# Patient Record
Sex: FEMALE | NOT HISPANIC OR LATINO | URBAN - METROPOLITAN AREA
[De-identification: names, ages, dates, MRNs, and addresses within clinical notes are randomized per-mention and may not be internally consistent; named-entity substitution may affect disease eponyms.]

---

## 2018-11-22 ENCOUNTER — INPATIENT (INPATIENT)
Facility: HOSPITAL | Age: 73
LOS: 3 days | Discharge: HOME | DRG: 153 | End: 2018-11-26
Attending: HOSPITALIST | Admitting: HOSPITALIST
Payer: MEDICAID

## 2018-11-22 VITALS
DIASTOLIC BLOOD PRESSURE: 86 MMHG | RESPIRATION RATE: 16 BRPM | SYSTOLIC BLOOD PRESSURE: 138 MMHG | TEMPERATURE: 99 F | OXYGEN SATURATION: 94 % | HEIGHT: 55 IN | HEART RATE: 91 BPM | WEIGHT: 130.07 LBS

## 2018-11-22 DIAGNOSIS — R07.9 CHEST PAIN, UNSPECIFIED: ICD-10-CM

## 2018-11-22 DIAGNOSIS — B97.4 RESPIRATORY SYNCYTIAL VIRUS AS THE CAUSE OF DISEASES CLASSIFIED ELSEWHERE: ICD-10-CM

## 2018-11-22 DIAGNOSIS — Z29.9 ENCOUNTER FOR PROPHYLACTIC MEASURES, UNSPECIFIED: ICD-10-CM

## 2018-11-22 DIAGNOSIS — R13.10 DYSPHAGIA, UNSPECIFIED: ICD-10-CM

## 2018-11-22 DIAGNOSIS — E78.5 HYPERLIPIDEMIA, UNSPECIFIED: ICD-10-CM

## 2018-11-22 DIAGNOSIS — I10 ESSENTIAL (PRIMARY) HYPERTENSION: ICD-10-CM

## 2018-11-22 DIAGNOSIS — E11.9 TYPE 2 DIABETES MELLITUS WITHOUT COMPLICATIONS: ICD-10-CM

## 2018-11-22 DIAGNOSIS — I63.9 CEREBRAL INFARCTION, UNSPECIFIED: ICD-10-CM

## 2018-11-22 DIAGNOSIS — I50.9 HEART FAILURE, UNSPECIFIED: ICD-10-CM

## 2018-11-22 LAB
ALBUMIN SERPL ELPH-MCNC: 3.7 G/DL — SIGNIFICANT CHANGE UP (ref 3.3–5)
ALP SERPL-CCNC: 72 U/L — SIGNIFICANT CHANGE UP (ref 40–120)
ALT FLD-CCNC: 13 U/L — SIGNIFICANT CHANGE UP (ref 10–45)
ANION GAP SERPL CALC-SCNC: 13 MMOL/L — SIGNIFICANT CHANGE UP (ref 5–17)
APPEARANCE UR: CLEAR — SIGNIFICANT CHANGE UP
APTT BLD: 31.4 SEC — SIGNIFICANT CHANGE UP (ref 27.5–36.3)
AST SERPL-CCNC: 13 U/L — SIGNIFICANT CHANGE UP (ref 10–40)
BASE EXCESS BLDV CALC-SCNC: 6.2 MMOL/L — HIGH (ref -2–2)
BILIRUB SERPL-MCNC: 0.2 MG/DL — SIGNIFICANT CHANGE UP (ref 0.2–1.2)
BILIRUB UR-MCNC: NEGATIVE — SIGNIFICANT CHANGE UP
BUN SERPL-MCNC: 10 MG/DL — SIGNIFICANT CHANGE UP (ref 7–23)
CA-I SERPL-SCNC: 1.2 MMOL/L — SIGNIFICANT CHANGE UP (ref 1.12–1.3)
CALCIUM SERPL-MCNC: 9.5 MG/DL — SIGNIFICANT CHANGE UP (ref 8.4–10.5)
CHLORIDE BLDV-SCNC: 103 MMOL/L — SIGNIFICANT CHANGE UP (ref 96–108)
CHLORIDE SERPL-SCNC: 99 MMOL/L — SIGNIFICANT CHANGE UP (ref 96–108)
CK SERPL-CCNC: 32 U/L — SIGNIFICANT CHANGE UP (ref 25–170)
CO2 BLDV-SCNC: 34 MMOL/L — HIGH (ref 22–30)
CO2 SERPL-SCNC: 27 MMOL/L — SIGNIFICANT CHANGE UP (ref 22–31)
COLOR SPEC: SIGNIFICANT CHANGE UP
CREAT SERPL-MCNC: 0.73 MG/DL — SIGNIFICANT CHANGE UP (ref 0.5–1.3)
DIFF PNL FLD: NEGATIVE — SIGNIFICANT CHANGE UP
GAS PNL BLDV: 136 MMOL/L — SIGNIFICANT CHANGE UP (ref 136–145)
GAS PNL BLDV: SIGNIFICANT CHANGE UP
GLUCOSE BLDV-MCNC: 149 MG/DL — HIGH (ref 70–99)
GLUCOSE SERPL-MCNC: 151 MG/DL — HIGH (ref 70–99)
GLUCOSE UR QL: NEGATIVE — SIGNIFICANT CHANGE UP
HCO3 BLDV-SCNC: 32 MMOL/L — HIGH (ref 21–29)
HCT VFR BLD CALC: 33.2 % — LOW (ref 34.5–45)
HCT VFR BLDA CALC: 33 % — LOW (ref 39–50)
HGB BLD CALC-MCNC: 10.8 G/DL — LOW (ref 11.5–15.5)
HGB BLD-MCNC: 10.9 G/DL — LOW (ref 11.5–15.5)
INR BLD: 1.14 RATIO — SIGNIFICANT CHANGE UP (ref 0.88–1.16)
KETONES UR-MCNC: NEGATIVE — SIGNIFICANT CHANGE UP
LACTATE BLDV-MCNC: 1.6 MMOL/L — SIGNIFICANT CHANGE UP (ref 0.7–2)
LEUKOCYTE ESTERASE UR-ACNC: NEGATIVE — SIGNIFICANT CHANGE UP
LIDOCAIN IGE QN: 112 U/L — HIGH (ref 7–60)
MAGNESIUM SERPL-MCNC: 1.8 MG/DL — SIGNIFICANT CHANGE UP (ref 1.6–2.6)
MCHC RBC-ENTMCNC: 28.6 PG — SIGNIFICANT CHANGE UP (ref 27–34)
MCHC RBC-ENTMCNC: 32.9 GM/DL — SIGNIFICANT CHANGE UP (ref 32–36)
MCV RBC AUTO: 86.8 FL — SIGNIFICANT CHANGE UP (ref 80–100)
NITRITE UR-MCNC: NEGATIVE — SIGNIFICANT CHANGE UP
NT-PROBNP SERPL-SCNC: 94 PG/ML — SIGNIFICANT CHANGE UP (ref 0–300)
OTHER CELLS CSF MANUAL: 11 ML/DL — LOW (ref 18–22)
PCO2 BLDV: 54 MMHG — HIGH (ref 35–50)
PH BLDV: 7.39 — SIGNIFICANT CHANGE UP (ref 7.35–7.45)
PH UR: 6.5 — SIGNIFICANT CHANGE UP (ref 5–8)
PLATELET # BLD AUTO: 254 K/UL — SIGNIFICANT CHANGE UP (ref 150–400)
PO2 BLDV: <50 MMHG — SIGNIFICANT CHANGE UP (ref 25–45)
POTASSIUM BLDV-SCNC: 3 MMOL/L — LOW (ref 3.5–5.3)
POTASSIUM SERPL-MCNC: 3.1 MMOL/L — LOW (ref 3.5–5.3)
POTASSIUM SERPL-SCNC: 3.1 MMOL/L — LOW (ref 3.5–5.3)
PROT SERPL-MCNC: 6.6 G/DL — SIGNIFICANT CHANGE UP (ref 6–8.3)
PROT UR-MCNC: NEGATIVE — SIGNIFICANT CHANGE UP
PROTHROM AB SERPL-ACNC: 13.2 SEC — HIGH (ref 10–12.9)
RAPID RVP RESULT: DETECTED
RBC # BLD: 3.83 M/UL — SIGNIFICANT CHANGE UP (ref 3.8–5.2)
RBC # FLD: 13.5 % — SIGNIFICANT CHANGE UP (ref 10.3–14.5)
RSV RNA SPEC QL NAA+PROBE: DETECTED
SAO2 % BLDV: 75 % — SIGNIFICANT CHANGE UP (ref 67–88)
SODIUM SERPL-SCNC: 139 MMOL/L — SIGNIFICANT CHANGE UP (ref 135–145)
SP GR SPEC: 1.01 — LOW (ref 1.01–1.02)
TROPONIN T, HIGH SENSITIVITY RESULT: 11 NG/L — SIGNIFICANT CHANGE UP (ref 0–51)
TROPONIN T, HIGH SENSITIVITY RESULT: 9 NG/L — SIGNIFICANT CHANGE UP (ref 0–51)
UROBILINOGEN FLD QL: NEGATIVE — SIGNIFICANT CHANGE UP
WBC # BLD: 7.2 K/UL — SIGNIFICANT CHANGE UP (ref 3.8–10.5)
WBC # FLD AUTO: 7.2 K/UL — SIGNIFICANT CHANGE UP (ref 3.8–10.5)

## 2018-11-22 PROCEDURE — 74177 CT ABD & PELVIS W/CONTRAST: CPT | Mod: 26

## 2018-11-22 PROCEDURE — 71046 X-RAY EXAM CHEST 2 VIEWS: CPT | Mod: 26

## 2018-11-22 PROCEDURE — 70450 CT HEAD/BRAIN W/O DYE: CPT | Mod: 26

## 2018-11-22 PROCEDURE — 99285 EMERGENCY DEPT VISIT HI MDM: CPT | Mod: 25

## 2018-11-22 PROCEDURE — 93010 ELECTROCARDIOGRAM REPORT: CPT

## 2018-11-22 PROCEDURE — 99223 1ST HOSP IP/OBS HIGH 75: CPT

## 2018-11-22 PROCEDURE — 71275 CT ANGIOGRAPHY CHEST: CPT | Mod: 26

## 2018-11-22 RX ORDER — POTASSIUM CHLORIDE 20 MEQ
40 PACKET (EA) ORAL ONCE
Qty: 0 | Refills: 0 | Status: COMPLETED | OUTPATIENT
Start: 2018-11-22 | End: 2018-11-22

## 2018-11-22 RX ORDER — ACETAMINOPHEN 500 MG
1000 TABLET ORAL ONCE
Qty: 0 | Refills: 0 | Status: DISCONTINUED | OUTPATIENT
Start: 2018-11-22 | End: 2018-11-26

## 2018-11-22 RX ADMIN — Medication 40 MILLIEQUIVALENT(S): at 15:46

## 2018-11-22 NOTE — H&P ADULT - PROBLEM SELECTOR PLAN 1
Patient with pleuritic chest pain, that is worse with cough, likely due to viral URI  troponin 11 --> 9  EKG shows no ST changes  Patient with likely CAD, previous history of ?MI  start aspirin and atorvastatin  check lipid panel, TSH, A1c  check echocardiogram

## 2018-11-22 NOTE — ED ADULT NURSE NOTE - CHIEF COMPLAINT QUOTE
chest pain x3 weeks. Pt arrived from [facility] yesterday. Pt had MI 2 weeks ago in [facility].

## 2018-11-22 NOTE — H&P ADULT - PROBLEM SELECTOR PLAN 5
CT head shows subacute stroke  start aspirin and atorvastatin  can obtain outpatient MRI/MRA  physical therapy evaluation  no signs of acute strove at this time CT head shows subacute stroke  start aspirin and atorvastatin  can obtain outpatient MRI/MRA  physical therapy evaluation  no signs of acute strove at this time  check B12, folate, RPR

## 2018-11-22 NOTE — ED PROVIDER NOTE - PHYSICAL EXAMINATION
Gen: No acute distress, alert, cooperative  Head: Normocephalic, Atraumatic  HEENT: PERRL, oral mucosa moist, normal conjunctiva  Lung: Course breath sounds SHY bases, crackles LLL  CV: rrr, no murmur  Abd: soft, mildly tender RLQ, ND, no rebound or guarding, mass palpated right abdomen periumbilical area  MSK: No LE edema  Neuro: No focal neurologic deficits  Skin: Warm and dry, no evidence of rash   Psych: normal affect, follows commands Gen: No acute distress, alert, cooperative  Head: Normocephalic, Atraumatic  HEENT: PERRL, oral mucosa moist, normal conjunctiva  Lung: Course breath sounds SHY bases, crackles LLL  CV: rrr, no murmur  Abd: soft, mildly tender RLQ, ND, no rebound or guarding, mass palpated right abdomen periumbilical area  : Partial vaginal prolapse, non-tender  MSK: No LE edema  Neuro: No focal neurologic deficits  Skin: Warm and dry, no evidence of rash   Psych: normal affect, follows commands

## 2018-11-22 NOTE — ED PROVIDER NOTE - MEDICAL DECISION MAKING DETAILS
CP, SOB, abdominal pain. Satting 92 on RA, tender RLQ and mass palpated. Crackles in SHY bases, worse on the left. Unclear pmh. Concern for CAD, PE, abdominal pathology, infection.

## 2018-11-22 NOTE — ED ADULT TRIAGE NOTE - CHIEF COMPLAINT QUOTE
chest pain x3 weeks. Pt arrived from [facility] yesterday. Pt had MI 2 weeks ago in [facility].

## 2018-11-22 NOTE — H&P ADULT - HISTORY OF PRESENT ILLNESS
74 yo [facility] female with PMH of HTN, HLD, DM, arthritis, presents here with chest pain.  Per family, patient  had an MI 3 weeks ago, had one stent placement. 74 yo Azeri female with PMH of HTN, HLD, CHF, ?depression, DM (diet controlled), arthritis, presents here with chest pain and SOB.  Patient is AAO x 3, but poor historian.  Majority of the history is obtained from son, who also has limited knowledge about patient's history.  Patient with multiple complaints.  She just arrived from [facility] yesterday, today has been having SOB and chest pain.      Per son, patient was admitted to hospital in [facility] with MI, but had no stent placement.  After 2 days of ICU stay, patient became altered, could not recognize family members.  She also had left arm weakness.  She was diagnosed with CVA.  Hospitalization complicated by pneumonia and pulmonary edema.  Patient was discharged after 12 days of hospital stay.  She was discharged on diuretics (lasix and aldactone) and abx (augmentin and levofloxacin, which she completed today).  Since discharge patient has been very weak, not able to walk without assistance, always feels like she would fall.  She has been having cough without sputum production.  States cough is improved compared to 3 weeks ago.  She also has left sided chest pain, which is always present, now getting worse.  She describes it as constant, worse with breathing and movement, reproducible with palpation.  Chest pain is worse with cough. She also has been having fever daily, as high as 102 along with chills.  Additionally, son states that since the stroke, patient has been having trouble swallowing regular food, now on pureed diet, still complains of food and water getting stuck when swallowing.  Denies any nausea, vomiting, abdominal pain.  Has occasional urinary hesitancy.

## 2018-11-22 NOTE — H&P ADULT - NSHPLABSRESULTS_GEN_ALL_CORE
Labs personally reviewed:                          10.9   7.2   )-----------( 254      ( 2018 14:41 )             33.2         139  |  99  |  10  ----------------------------<  151<H>  3.1<L>   |  27  |  0.73    Ca    9.5      2018 14:41  Mg     1.8         TPro  6.6  /  Alb  3.7  /  TBili  0.2  /  DBili  x   /  AST  13  /  ALT  13  /  AlkPhos  72      CARDIAC MARKERS ( 2018 14:41 )  x     / x     / 32 U/L / x     / x          LIVER FUNCTIONS - ( 2018 14:41 )  Alb: 3.7 g/dL / Pro: 6.6 g/dL / ALK PHOS: 72 U/L / ALT: 13 U/L / AST: 13 U/L / GGT: x           PT/INR - ( 2018 14:41 )   PT: 13.2 sec;   INR: 1.14 ratio         PTT - ( 2018 14:41 )  PTT:31.4 sec  Urinalysis Basic - ( 2018 15:16 )    Color: Light Yellow / Appearance: Clear / S.008 / pH: x  Gluc: x / Ketone: Negative  / Bili: Negative / Urobili: Negative   Blood: x / Protein: Negative / Nitrite: Negative   Leuk Esterase: Negative / RBC: x / WBC x   Sq Epi: x / Non Sq Epi: x / Bacteria: x      CAPILLARY BLOOD GLUCOSE          Imaging:  CXR reviewed:  No focal consolidation, pleural effusion, pneumothorax. Prominent right   hilar. If clinically warranted CT scan should obtained.  Cardiomegaly.  Osseous structures are unremarkable.    CT head reviewed:     Hypodensity within the anterior limb of the right internal capsule may   represent an acute/subacute lacunar infarct. No acute intracranial   hemorrhage.    CTA chest/CT abd/pelv w/IV contrast reviewed   Limited evaluation for segmental and subsegmental pulmonary   embolism. No main, left, right, or lobar pulmonary embolism.    Infiltration of the periumbilical fat likely related to subcutaneous   injection    No acute intra-abdominal pathology or pelvic.      EKG personally reviewed: nsr, LVH

## 2018-11-22 NOTE — CONSULT NOTE ADULT - SUBJECTIVE AND OBJECTIVE BOX
[facility] / [facility] NEUROLOGY CONSULT SERVICE    EREN SHARMA  Female  MRN-[id number]    HPI:    74yo Serbian F with unclear PMHx (HTN, HLD, DM, arthritis?) presenting with chest pain. Family insisted on translating. She just arrived yesterday from [facility] for the first time in the . As per family, 3 weeks ago patient had MI, got 1 stent. While in the hospital she had a stroke, PNA and pulmonary edema(per report from family member, unclear how much is true). She also has been having productive cough.  Chest pain had improved, but now having new chest pain that started this morning. Also notes mild SOB and mild RLQ abdominal pain. Also notes pressure when urinating, no dysuria. Also feels more weak and fatigued. Family thinks patient is mentally back to her baseline, but feels weak throughout, particularly in both legs. ROS +ve for chest pain, subjective fever, chills, HA. CTH shows hypodensity within the anterior limb of the right internal capsule which may represent an acute/subacute lacunar infarct. CTA C/A/P -ve for acute pathology, UA -ve.  Encounter limited by varying versions of history from both family and patient.   Denies trauma, LOC, vision changes, tinnitus, dizziness, SOB, incontinence, nausea, vomiting, diarrhea.  NIHSS =2, MRS =1      ROS: All negative except as mentioned in HPI    PAST MEDICAL & SURGICAL HISTORY:  Stroke  Pneumonia    Allergies  ibuprofen (Pruritus)  Intolerances      VITAL SIGNS:  Vital Signs Last 24 Hrs  T(C): 36.8 (22 Nov 2018 19:46), Max: 37.4 (22 Nov 2018 13:21)  T(F): 98.3 (22 Nov 2018 19:46), Max: 99.4 (22 Nov 2018 13:21)  HR: 90 (22 Nov 2018 19:46) (84 - 96)  BP: 131/69 (22 Nov 2018 19:46) (131/69 - 154/77)  BP(mean): --  RR: 16 (22 Nov 2018 19:46) (16 - 20)  SpO2: 97% (22 Nov 2018 19:46) (93% - 99%)    PHYSICAL EXAMINATION:  General: elderly, poor dentition, appears fatigued, smells of betel leaf/areca nut   Eyes: Conjunctiva and sclera clear.  Neck: Supple, nontender  Skin: no rash, no edema noted  Neurologic:  - Mental Status:  Alert, awake, oriented to person, place, and time; Speech is fluent with intact naming, repetition, and comprehension  - Cranial Nerves II-XII:  VFF, EOMI, PERRLA, V1-V3 intact, no facial asymmetry, t/p midline, SCM/trap intact.  - Motor:  Strength is 4+/5 UEs, limited by shoulder pain.  There is no pronator drift.  Normal muscle bulk and tone throughout.  - Reflexes:  1+ and symmetric at the biceps, triceps, brachioradialis, knees, and ankles.  Plantar responses mute.  - Sensory:  Intact to light touch throughout.  - Coordination:  Finger-nose-finger and heel-knee-shin intact without dysmetria.    - Gait:   not tested as pt feels weak    LABS:                          10.9   7.2   )-----------( 254      ( 22 Nov 2018 14:41 )             33.2     11-22    139  |  99  |  10  ----------------------------<  151<H>  3.1<L>   |  27  |  0.73    Ca    9.5      22 Nov 2018 14:41  Mg     1.8     11-22    TPro  6.6  /  Alb  3.7  /  TBili  0.2  /  DBili  x   /  AST  13  /  ALT  13  /  AlkPhos  72  11-22    PT/INR - ( 22 Nov 2018 14:41 )   PT: 13.2 sec;   INR: 1.14 ratio         PTT - ( 22 Nov 2018 14:41 )  PTT:31.4 sec    RADIOLOGY & ADDITIONAL STUDIES:    < from: CT Head No Cont (11.22.18 @ 18:49) >  IMPRESSION:    Hypodensity within the anterior limb of the right internal capsule may   represent an acute/subacute lacunar infarct. No acute intracranial   hemorrhage.    < end of copied text >      Assessment / Plan:  74yo Serbian F with unclear PMHx (HTN, HLD, DM, arthritis?) presenting with chest pain.  She just arrived yesterday from [facility] for the first time in the . As per family, 3 weeks ago patient had MI, got 1 stent. While in the hospital she had a stroke, PNA and pulmonary edema(per report from family member, unclear how much is true). She also has been having productive cough with fever chills. CTH shows subacute R internal capsule infarct, CT C/A/P unrevealing.    Imp: History is very unclear given both patient and family unsure, her stroke on CT likely subacute from 3 weeks ago as family states, exam only revealing of bilateral LE weakness which patient says is because of her knee pains. Mentally she is back at her baseline, just appears fatigued. There is otherwise no focal deficits that could be appreciated at this time. Her current presentation more concerning for infectious/cardiac etiology.    - she would need further medical optimization, r/o cardiac and infectious (flu? TB?)  - Telemetry monitoring given recent stroke and MI (confirm stent?)  - MRI Brain w/o, MRA H w/o , MRA Neck w/  - TTE w/ bubble study  - ASA 81 and Plavix 75   - NPO until clears dysphagia  - Send B12, folate, TSH, RPR, A1c, Lipid panel  - consider empiric thiamine IV dose x2 at least  - [place] [id number], [id number] once MRI done.  - Rest of management as per primary team  - Kindly contact us if there is a change in patient's condition or for any questions.    Thank you for involving us in the care of this patient. [facility] / [facility] NEUROLOGY CONSULT SERVICE    EREN SHARMA  Female  MRN-[id number]    HPI:    72yo Amharic F with unclear PMHx (HTN, HLD, DM, arthritis?) presenting with chest pain. Family insisted on translating. She just arrived yesterday from [facility] for the first time in the . As per family, 3 weeks ago patient had MI, got 1 stent. While in the hospital she had a stroke, PNA and pulmonary edema(per report from family member, unclear how much is true). She also has been having productive cough.  Chest pain had improved, but now having new chest pain that started this morning. Also notes mild SOB and mild RLQ abdominal pain. Also notes pressure when urinating, no dysuria. Also feels more weak and fatigued. Family thinks patient is mentally back to her baseline, but feels weak throughout, particularly in both legs. ROS +ve for chest pain, subjective fever, chills, HA. CTH shows hypodensity within the anterior limb of the right internal capsule which may represent an acute/subacute lacunar infarct. CTA C/A/P -ve for acute pathology, UA -ve.  Encounter limited by varying versions of history from both family and patient.   Denies trauma, LOC, vision changes, tinnitus, dizziness, SOB, incontinence, nausea, vomiting, diarrhea.  NIHSS =2, MRS =1      ROS: All negative except as mentioned in HPI    PAST MEDICAL & SURGICAL HISTORY:  Stroke  Pneumonia    Allergies  ibuprofen (Pruritus)  Intolerances      VITAL SIGNS:  Vital Signs Last 24 Hrs  T(C): 36.8 (22 Nov 2018 19:46), Max: 37.4 (22 Nov 2018 13:21)  T(F): 98.3 (22 Nov 2018 19:46), Max: 99.4 (22 Nov 2018 13:21)  HR: 90 (22 Nov 2018 19:46) (84 - 96)  BP: 131/69 (22 Nov 2018 19:46) (131/69 - 154/77)  BP(mean): --  RR: 16 (22 Nov 2018 19:46) (16 - 20)  SpO2: 97% (22 Nov 2018 19:46) (93% - 99%)    PHYSICAL EXAMINATION:  General: elderly, poor dentition, appears fatigued, smells of betel leaf/areca nut   Eyes: Conjunctiva and sclera clear.  Neck: Supple, nontender  Skin: no rash, no edema noted  Neurologic:  - Mental Status:  Alert, awake, oriented to person, place, and time; Speech is fluent with intact naming, repetition, and comprehension  - Cranial Nerves II-XII:  VFF, EOMI, PERRLA, V1-V3 intact, no facial asymmetry, t/p midline, SCM/trap intact.  - Motor:  Strength is 4+/5 UEs, limited by shoulder pain.  There is no pronator drift.  Normal muscle bulk and tone throughout.  - Reflexes:  1+ and symmetric at the biceps, triceps, brachioradialis, knees, and ankles.  Plantar responses mute.  - Sensory:  Intact to light touch throughout.  - Coordination:  Finger-nose-finger and heel-knee-shin intact without dysmetria.    - Gait:   not tested as pt feels weak    LABS:                          10.9   7.2   )-----------( 254      ( 22 Nov 2018 14:41 )             33.2     11-22    139  |  99  |  10  ----------------------------<  151<H>  3.1<L>   |  27  |  0.73    Ca    9.5      22 Nov 2018 14:41  Mg     1.8     11-22    TPro  6.6  /  Alb  3.7  /  TBili  0.2  /  DBili  x   /  AST  13  /  ALT  13  /  AlkPhos  72  11-22    PT/INR - ( 22 Nov 2018 14:41 )   PT: 13.2 sec;   INR: 1.14 ratio         PTT - ( 22 Nov 2018 14:41 )  PTT:31.4 sec    RADIOLOGY & ADDITIONAL STUDIES:    < from: CT Head No Cont (11.22.18 @ 18:49) >  IMPRESSION:    Hypodensity within the anterior limb of the right internal capsule may   represent an acute/subacute lacunar infarct. No acute intracranial   hemorrhage.    < end of copied text >      Assessment / Plan:  72yo Amharic F with unclear PMHx (HTN, HLD, DM, arthritis?) presenting with chest pain.  She just arrived yesterday from [facility] for the first time in the . As per family, 3 weeks ago patient had MI, got 1 stent. While in the hospital she had a stroke, PNA and pulmonary edema(per report from family member, unclear how much is true). She also has been having productive cough with fever chills. CTH shows subacute R internal capsule infarct, CT C/A/P unrevealing.    Imp: History is very unclear given both patient and family unsure, her stroke on CT likely subacute from 3 weeks ago as family states, exam only revealing of bilateral LE weakness which patient says is because of her knee pains. Mentally she is back at her baseline, just appears fatigued. There is otherwise no focal deficits that could be appreciated at this time. Her current presentation more concerning for infectious/cardiac etiology.    - she would need further medical optimization, r/o cardiac and infectious (flu? TB?)  - Telemetry monitoring given recent stroke and MI (confirm stent?)  - MRI Brain w/o, MRA H w/o , MRA Neck w/  - TTE w/ bubble study  - ASA 81 and Plavix 75   - NPO until clears dysphagia  - Send B12, folate, TSH, RPR, A1c, Lipid panel  - consider empiric thiamine IV dose x2 at least  - [place] [id number], [id number] once MRI done.  - Rest of management as per primary team    Thank you for involving us in the care of this patient.

## 2018-11-22 NOTE — ED PROVIDER NOTE - ATTENDING CONTRIBUTION TO CARE
Attending MD Phelps.  Agree with above.  Pt is a 72 yo female with unclear PMHx except states that 3 wks ago she had a heart attack with possible stent, while inpt had a stroke and continues to have mild residual L sided weakness.  Has improved to a large extent with improvement in her ability to recognize people.  Had PNA while inpt.  Today presents because of recurrent CP.  Pt was hypoxic to 92% improved with nasal canula.  CP is described as L sided and intermittent.  Pt unable to give hx, only family able to give above hx.  Vague RLQ/periumbilical mass.  Appears to have site of injections in abdomen poss lovenox while inpt in [facility].  Poss hematoma.  Non-tender mass, mild RLQ TTP.  No LE edema.  Coarse breath sounds to bilateral bases, inc crackles to LLL. Attending MD Phelps.  Agree with above.  Pt is a 74 yo female with unclear PMHx except states that 3 wks ago she had a heart attack with possible stent, while inpt had a stroke and continues to have mild residual L sided weakness.  Has improved to a large extent with improvement in her ability to recognize people.  Had PNA while inpt.  Today presents because of recurrent CP.  Pt was hypoxic to 92% improved with nasal canula.  CP is described as L sided and intermittent.  Pt unable to give hx, only family able to give above hx.  Vague RLQ/periumbilical mass.  Appears to have site of injections in abdomen poss lovenox while inpt in [facility].  Poss hematoma.  Non-tender mass, mild RLQ TTP.  No LE edema.  Coarse breath sounds to bilateral bases, inc crackles to LLL.  Signed out to incoming team pending imaging, labs and likely admission for prob ongoing PNA.

## 2018-11-22 NOTE — H&P ADULT - NSHPREVIEWOFSYSTEMS_GEN_ALL_CORE
CONSTITUTIONAL: +generalized weakness, fever, chills  EYES/ENT: dysphagia  NECK: No pain or stiffness  RESPIRATORY: +cough; denies orthopnea or PND  CARDIOVASCULAR: +chest pain  EXTREMITIES: no le edema, cyanosis, clubbing  MUSCULOSKELETAL: no joint pain, swelling  GASTROINTESTINAL: No abdominal or epigastric pain. No nausea, vomiting, or hematemesis; No diarrhea or constipation. No melena or hematochezia.  BACK: No back pain  GENITOURINARY: +occasional urinary hesitancy  NEUROLOGICAL: left arm weakness improved  SKIN: No itching, burning, rashes, or lesions   PSYCH: no agitation  All other review of systems is negative unless indicated above.

## 2018-11-22 NOTE — ED ADULT NURSE NOTE - NSIMPLEMENTINTERV_GEN_ALL_ED
Implemented All Fall with Harm Risk Interventions:  [place] to call system. Call bell, personal items and telephone within reach. Instruct patient to call for assistance. Room bathroom lighting operational. Non-slip footwear when patient is off stretcher. Physically safe environment: no spills, clutter or unnecessary equipment. Stretcher in lowest position, wheels locked, appropriate side rails in place. Provide visual cue, wrist band, yellow gown, etc. Monitor gait and stability. Monitor for mental status changes and reorient to person, place, and time. Review medications for side effects contributing to fall risk. Reinforce activity limits and safety measures with patient and family. Provide visual clues: red socks.

## 2018-11-22 NOTE — ED ADULT NURSE NOTE - OBJECTIVE STATEMENT
73 y.o F Jonh speaking with PMH of MI, CVA, asthma, diabetes, pneumonia, pulmonary edema presents to the ED c.o SOB and substernal/L below breast pain. As per family, pt. lives in [facility]. Was admitted about 3 weeks ago in [facility] s/p MI (as per family, pt. had stent placed), also had CVA while in hospital and developed pneumonia/pulmonary edema. Family states "mother had water in lungs." Pt. developed fever after discharged home and was started on abx. Family seems unsure of what medications pt. has been taking. Pt. traveled here yesterday from [facility] - family reports she stated CP improved after stent placed, however, CP has returned. Rates pain level 6/10 at this time. Pt. sating at 93% on RA - placed on 2L NC - sating at 98% at this time. Pt. does not appear to be in any acute distress at this time. Rectal temp - 99.1. ABD appears distended, mass noted during palpation to R mid quadrant near umbilical region -pt. reports tenderness. Ecchymosis noted around umbilicus. Pt. reports pressure when urinating, denies blood in urine. Pt. has nonproductive cough - family reports this is chronic. Crackles noted to bilateral bases. No lower extremity edema noted. Pt. placed on CM.  Denies fever, chills, n/v/d at this time. Residual weakness noted to L side. As per family, pt. ambulates with wheelchair at home. Will continue to monitor. Family at bedside. 73 y.o F John speaking with PMH of MI, CVA, asthma, diabetes, pneumonia, pulmonary edema presents to the ED c.o SOB and substernal/L below breast pain. As per family, pt. lives in [facility]. Was admitted about 3 weeks ago in [facility] s/p MI (as per family, pt. had stent placed), also had CVA while in hospital and developed pneumonia/pulmonary edema. Family states "mother had water in lungs." Pt. developed fever after discharged home and was started on abx. Family seems unsure of what medications pt. has been taking. Pt. traveled here yesterday from [facility] - family reports she stated CP improved after stent placed, however, CP has returned. Rates pain level 6/10 at this time. Pt. describes pain as squeezing sensation. Pt. sating at 93% on RA - placed on 2L NC - sating at 98% at this time. Pt. does not appear to be in any acute distress at this time. Rectal temp - 99.1. ABD appears distended, mass noted during palpation to R mid quadrant near umbilical region -pt. reports tenderness. Ecchymosis noted around umbilicus. Pt. reports pressure when urinating, denies blood in urine. Pt. has nonproductive cough - family reports this is chronic. Crackles noted to bilateral bases. No lower extremity edema noted. Pt. placed on CM.  Denies fever, chills, n/v/d at this time. Residual weakness noted to L side. As per family, pt. ambulates with wheelchair at home. Will continue to monitor. Family at bedside. 73 y.o F John speaking with PMH of MI, CVA, asthma, diabetes, pneumonia, pulmonary edema presents to the ED c.o SOB and substernal/L below breast pain. As per family, pt. lives in [facility]. Was admitted about 3 weeks ago in [facility] s/p MI (as per family, pt. had stent placed), also had CVA while in hospital and developed pneumonia/pulmonary edema. Pt. reports she does not recall exactly what happened, states she was in the ICU for 3 days and was unconscious. Family states "mother had water in lungs." Pt. developed fever after discharged home and was started on abx - Augmentin. Family seems unsure of what medications pt. has been taking. Pt. traveled here yesterday from [facility] - family reports she stated CP improved - did not have angiogram performed. Reports CP has returned, pain worse when taking deep breaths. Rates pain level 6/10 at this time. Pt. describes pain as squeezing sensation. Pt. sating at 93% on RA - placed on 2L NC - sating at 98% at this time. Pt. does not appear to be in any acute distress at this time. Rectal temp - 99.1. ABD appears distended, mass noted during palpation to R mid quadrant near umbilical region -pt. reports tenderness. Ecchymosis noted around umbilicus. Pt. reports pressure when urinating, denies blood in urine. Pt. has nonproductive cough - family reports this is chronic. Crackles noted to bilateral bases. No lower extremity edema noted. Pt. placed on CM.  Denies fever, chills, n/v/d at this time. Residual weakness noted to L side. As per family, pt. ambulates with wheelchair at home. Will continue to monitor. Family at bedside.

## 2018-11-22 NOTE — H&P ADULT - PMH
Asthma    CHF (congestive heart failure)    HLD (hyperlipidemia)    HTN (hypertension)    Myocardial infarction    Pneumonia    Stroke

## 2018-11-22 NOTE — ED PROVIDER NOTE - OBJECTIVE STATEMENT
72yo F unclear pmh presenting with chest pain. She just arrived yesterday from [facility] for the first time in the . 3 weeks ago patient had MI, got 1 stent. While in the hospital she had a stroke and pna and pulmonary edema(per report from family member, unclear how much is true). Chest pain improved, but now having new chest pain that started this morning. Also notes mild SOB and mild RLQ abdominal pain. Also notes pressure when urinating, no dusyria. Also feels more weak and fatigued. Denies fevers, HA.   Family thinks patient is still not back to baseline, but acting better than she did after stroke. No acute changes. Residual left weakness.

## 2018-11-22 NOTE — ED ADULT NURSE REASSESSMENT NOTE - NS ED NURSE REASSESS COMMENT FT1
1945- medications from [facility] checked & listed down on meds history. Awaiting for reevaluation & disposition.

## 2018-11-22 NOTE — H&P ADULT - PROBLEM SELECTOR PLAN 6
patient was on lasix and aldactone at home, which will hold for now  patient appears to be euvolemic  check echocardiogram

## 2018-11-22 NOTE — H&P ADULT - NSHPPHYSICALEXAM_GEN_ALL_CORE
PHYSICAL EXAM:  Vital Signs Last 24 Hrs  T(C): 36.8 (11-22-18 @ 23:33)  T(F): 98.2 (11-22-18 @ 23:33), Max: 99.4 (11-22-18 @ 13:21)  HR: 84 (11-22-18 @ 23:33) (84 - 96)  BP: 144/71 (11-22-18 @ 23:33)  BP(mean): --  RR: 18 (11-22-18 @ 23:33) (16 - 20)  SpO2: 100% (11-22-18 @ 23:33) (93% - 100%)  Wt(kg): --    Constitutional: NAD, answers all questions appropriately and follows commands  EYES: EOMI  ENT:  Normal Hearing, no tonsillar exudates   Neck: Soft and supple, No JVD  Lungs: +b/l rhonchi  Heart: S1 and S2, regular rate and rhythm, no Murmurs, gallops or rubs  Abdomen: Bowel Sounds present, soft, nontender, nondistended, no guarding, no rebound  Extremities: No cyanosis or clubbing; warm to touch  Vascular: 2+ peripheral pulses lower ex  Neurological: A/O x 3, no focal deficits  Musculoskeletal: 5/5 strength b/l LE, 4+/5 left upper extremity, 5/5 right upper extremity  Skin: No rashes  Psych: no depression or anhedonia  HEME: no bruises, no nose bleeds

## 2018-11-22 NOTE — H&P ADULT - ASSESSMENT
74 yo [facility] female with PMH of HTN, HLD, CHF, ?depression, DM (diet controlled), arthritis, presents here with chest pain and SOB.

## 2018-11-23 LAB
ALBUMIN SERPL ELPH-MCNC: 3.9 G/DL — SIGNIFICANT CHANGE UP (ref 3.3–5)
ALP SERPL-CCNC: 81 U/L — SIGNIFICANT CHANGE UP (ref 40–120)
ALT FLD-CCNC: 12 U/L — SIGNIFICANT CHANGE UP (ref 10–45)
ANION GAP SERPL CALC-SCNC: 13 MMOL/L — SIGNIFICANT CHANGE UP (ref 5–17)
AST SERPL-CCNC: 13 U/L — SIGNIFICANT CHANGE UP (ref 10–40)
BASOPHILS # BLD AUTO: 0.02 K/UL — SIGNIFICANT CHANGE UP (ref 0–0.2)
BASOPHILS NFR BLD AUTO: 0.3 % — SIGNIFICANT CHANGE UP (ref 0–2)
BILIRUB SERPL-MCNC: 0.4 MG/DL — SIGNIFICANT CHANGE UP (ref 0.2–1.2)
BUN SERPL-MCNC: 9 MG/DL — SIGNIFICANT CHANGE UP (ref 7–23)
CALCIUM SERPL-MCNC: 9.8 MG/DL — SIGNIFICANT CHANGE UP (ref 8.4–10.5)
CHLORIDE SERPL-SCNC: 98 MMOL/L — SIGNIFICANT CHANGE UP (ref 96–108)
CHOLEST SERPL-MCNC: 176 MG/DL — SIGNIFICANT CHANGE UP (ref 10–199)
CO2 SERPL-SCNC: 29 MMOL/L — SIGNIFICANT CHANGE UP (ref 22–31)
CREAT SERPL-MCNC: 0.69 MG/DL — SIGNIFICANT CHANGE UP (ref 0.5–1.3)
CULTURE RESULTS: SIGNIFICANT CHANGE UP
EOSINOPHIL # BLD AUTO: 0.19 K/UL — SIGNIFICANT CHANGE UP (ref 0–0.5)
EOSINOPHIL NFR BLD AUTO: 2.9 % — SIGNIFICANT CHANGE UP (ref 0–6)
FOLATE SERPL-MCNC: >20 NG/ML — SIGNIFICANT CHANGE UP
GLUCOSE BLDC GLUCOMTR-MCNC: 120 MG/DL — HIGH (ref 70–99)
GLUCOSE BLDC GLUCOMTR-MCNC: 82 MG/DL — SIGNIFICANT CHANGE UP (ref 70–99)
GLUCOSE BLDC GLUCOMTR-MCNC: 87 MG/DL — SIGNIFICANT CHANGE UP (ref 70–99)
GLUCOSE BLDC GLUCOMTR-MCNC: 88 MG/DL — SIGNIFICANT CHANGE UP (ref 70–99)
GLUCOSE SERPL-MCNC: 87 MG/DL — SIGNIFICANT CHANGE UP (ref 70–99)
HBA1C BLD-MCNC: 6.1 % — HIGH (ref 4–5.6)
HCT VFR BLD CALC: 33.8 % — LOW (ref 34.5–45)
HDLC SERPL-MCNC: 38 MG/DL — LOW
HGB BLD-MCNC: 10.9 G/DL — LOW (ref 11.5–15.5)
IMM GRANULOCYTES NFR BLD AUTO: 0.5 % — SIGNIFICANT CHANGE UP (ref 0–1.5)
LIPID PNL WITH DIRECT LDL SERPL: 120 MG/DL — SIGNIFICANT CHANGE UP
LYMPHOCYTES # BLD AUTO: 2.26 K/UL — SIGNIFICANT CHANGE UP (ref 1–3.3)
LYMPHOCYTES # BLD AUTO: 34 % — SIGNIFICANT CHANGE UP (ref 13–44)
MAGNESIUM SERPL-MCNC: 2 MG/DL — SIGNIFICANT CHANGE UP (ref 1.6–2.6)
MCHC RBC-ENTMCNC: 28.5 PG — SIGNIFICANT CHANGE UP (ref 27–34)
MCHC RBC-ENTMCNC: 32.2 GM/DL — SIGNIFICANT CHANGE UP (ref 32–36)
MCV RBC AUTO: 88.5 FL — SIGNIFICANT CHANGE UP (ref 80–100)
MONOCYTES # BLD AUTO: 0.64 K/UL — SIGNIFICANT CHANGE UP (ref 0–0.9)
MONOCYTES NFR BLD AUTO: 9.6 % — SIGNIFICANT CHANGE UP (ref 2–14)
NEUTROPHILS # BLD AUTO: 3.5 K/UL — SIGNIFICANT CHANGE UP (ref 1.8–7.4)
NEUTROPHILS NFR BLD AUTO: 52.7 % — SIGNIFICANT CHANGE UP (ref 43–77)
PHOSPHATE SERPL-MCNC: 5.1 MG/DL — HIGH (ref 2.5–4.5)
PLATELET # BLD AUTO: 296 K/UL — SIGNIFICANT CHANGE UP (ref 150–400)
POTASSIUM SERPL-MCNC: 4.1 MMOL/L — SIGNIFICANT CHANGE UP (ref 3.5–5.3)
POTASSIUM SERPL-SCNC: 4.1 MMOL/L — SIGNIFICANT CHANGE UP (ref 3.5–5.3)
PROT SERPL-MCNC: 7.1 G/DL — SIGNIFICANT CHANGE UP (ref 6–8.3)
RBC # BLD: 3.82 M/UL — SIGNIFICANT CHANGE UP (ref 3.8–5.2)
RBC # FLD: 14.7 % — HIGH (ref 10.3–14.5)
SODIUM SERPL-SCNC: 140 MMOL/L — SIGNIFICANT CHANGE UP (ref 135–145)
SPECIMEN SOURCE: SIGNIFICANT CHANGE UP
T PALLIDUM AB TITR SER: NEGATIVE — SIGNIFICANT CHANGE UP
TOTAL CHOLESTEROL/HDL RATIO MEASUREMENT: 4.6 RATIO — SIGNIFICANT CHANGE UP (ref 3.3–7.1)
TRIGL SERPL-MCNC: 89 MG/DL — SIGNIFICANT CHANGE UP (ref 10–149)
TSH SERPL-MCNC: 1.57 UIU/ML — SIGNIFICANT CHANGE UP (ref 0.27–4.2)
VIT B12 SERPL-MCNC: 1599 PG/ML — HIGH (ref 232–1245)
WBC # BLD: 6.64 K/UL — SIGNIFICANT CHANGE UP (ref 3.8–10.5)
WBC # FLD AUTO: 6.64 K/UL — SIGNIFICANT CHANGE UP (ref 3.8–10.5)

## 2018-11-23 PROCEDURE — 99255 IP/OBS CONSLTJ NEW/EST HI 80: CPT

## 2018-11-23 PROCEDURE — 99232 SBSQ HOSP IP/OBS MODERATE 35: CPT

## 2018-11-23 PROCEDURE — 93306 TTE W/DOPPLER COMPLETE: CPT | Mod: 26

## 2018-11-23 PROCEDURE — 70544 MR ANGIOGRAPHY HEAD W/O DYE: CPT | Mod: 26,59

## 2018-11-23 PROCEDURE — 70551 MRI BRAIN STEM W/O DYE: CPT | Mod: 26

## 2018-11-23 PROCEDURE — 70549 MR ANGIOGRAPH NECK W/O&W/DYE: CPT | Mod: 26

## 2018-11-23 RX ORDER — THIAMINE MONONITRATE (VIT B1) 100 MG
500 TABLET ORAL EVERY 8 HOURS
Qty: 0 | Refills: 0 | Status: COMPLETED | OUTPATIENT
Start: 2018-11-23 | End: 2018-11-26

## 2018-11-23 RX ORDER — LOSARTAN POTASSIUM 100 MG/1
25 TABLET, FILM COATED ORAL DAILY
Qty: 0 | Refills: 0 | Status: DISCONTINUED | OUTPATIENT
Start: 2018-11-23 | End: 2018-11-26

## 2018-11-23 RX ORDER — ATORVASTATIN CALCIUM 80 MG/1
80 TABLET, FILM COATED ORAL AT BEDTIME
Qty: 0 | Refills: 0 | Status: DISCONTINUED | OUTPATIENT
Start: 2018-11-23 | End: 2018-11-26

## 2018-11-23 RX ORDER — BUDESONIDE AND FORMOTEROL FUMARATE DIHYDRATE 160; 4.5 UG/1; UG/1
2 AEROSOL RESPIRATORY (INHALATION)
Qty: 0 | Refills: 0 | Status: DISCONTINUED | OUTPATIENT
Start: 2018-11-23 | End: 2018-11-26

## 2018-11-23 RX ORDER — SODIUM CHLORIDE 9 MG/ML
1000 INJECTION INTRAMUSCULAR; INTRAVENOUS; SUBCUTANEOUS
Qty: 0 | Refills: 0 | Status: COMPLETED | OUTPATIENT
Start: 2018-11-23 | End: 2018-11-24

## 2018-11-23 RX ORDER — IPRATROPIUM/ALBUTEROL SULFATE 18-103MCG
3 AEROSOL WITH ADAPTER (GRAM) INHALATION EVERY 6 HOURS
Qty: 0 | Refills: 0 | Status: DISCONTINUED | OUTPATIENT
Start: 2018-11-23 | End: 2018-11-26

## 2018-11-23 RX ORDER — ENOXAPARIN SODIUM 100 MG/ML
40 INJECTION SUBCUTANEOUS EVERY 24 HOURS
Qty: 0 | Refills: 0 | Status: DISCONTINUED | OUTPATIENT
Start: 2018-11-23 | End: 2018-11-26

## 2018-11-23 RX ORDER — QUETIAPINE FUMARATE 200 MG/1
25 TABLET, FILM COATED ORAL DAILY
Qty: 0 | Refills: 0 | Status: DISCONTINUED | OUTPATIENT
Start: 2018-11-23 | End: 2018-11-26

## 2018-11-23 RX ORDER — FUROSEMIDE 40 MG
0 TABLET ORAL
Qty: 0 | Refills: 0 | COMMUNITY

## 2018-11-23 RX ORDER — ESOMEPRAZOLE MAGNESIUM 40 MG/1
0 CAPSULE, DELAYED RELEASE ORAL
Qty: 0 | Refills: 0 | COMMUNITY

## 2018-11-23 RX ORDER — INFLUENZA VIRUS VACCINE 15; 15; 15; 15 UG/.5ML; UG/.5ML; UG/.5ML; UG/.5ML
0.5 SUSPENSION INTRAMUSCULAR ONCE
Qty: 0 | Refills: 0 | Status: DISCONTINUED | OUTPATIENT
Start: 2018-11-23 | End: 2018-11-26

## 2018-11-23 RX ORDER — ASPIRIN/CALCIUM CARB/MAGNESIUM 324 MG
81 TABLET ORAL DAILY
Qty: 0 | Refills: 0 | Status: DISCONTINUED | OUTPATIENT
Start: 2018-11-23 | End: 2018-11-26

## 2018-11-23 RX ADMIN — QUETIAPINE FUMARATE 25 MILLIGRAM(S): 200 TABLET, FILM COATED ORAL at 14:01

## 2018-11-23 RX ADMIN — Medication 200 MILLIGRAM(S): at 05:41

## 2018-11-23 RX ADMIN — ENOXAPARIN SODIUM 40 MILLIGRAM(S): 100 INJECTION SUBCUTANEOUS at 05:43

## 2018-11-23 RX ADMIN — SODIUM CHLORIDE 75 MILLILITER(S): 9 INJECTION INTRAMUSCULAR; INTRAVENOUS; SUBCUTANEOUS at 22:08

## 2018-11-23 RX ADMIN — Medication 81 MILLIGRAM(S): at 14:00

## 2018-11-23 RX ADMIN — Medication 105 MILLIGRAM(S): at 14:03

## 2018-11-23 RX ADMIN — ATORVASTATIN CALCIUM 80 MILLIGRAM(S): 80 TABLET, FILM COATED ORAL at 22:08

## 2018-11-23 RX ADMIN — Medication 3 MILLILITER(S): at 17:39

## 2018-11-23 RX ADMIN — Medication 3 MILLILITER(S): at 14:02

## 2018-11-23 RX ADMIN — BUDESONIDE AND FORMOTEROL FUMARATE DIHYDRATE 2 PUFF(S): 160; 4.5 AEROSOL RESPIRATORY (INHALATION) at 17:39

## 2018-11-23 RX ADMIN — Medication 3 MILLILITER(S): at 23:43

## 2018-11-23 RX ADMIN — Medication 200 MILLIGRAM(S): at 14:00

## 2018-11-23 RX ADMIN — BUDESONIDE AND FORMOTEROL FUMARATE DIHYDRATE 2 PUFF(S): 160; 4.5 AEROSOL RESPIRATORY (INHALATION) at 05:40

## 2018-11-23 RX ADMIN — LOSARTAN POTASSIUM 25 MILLIGRAM(S): 100 TABLET, FILM COATED ORAL at 05:43

## 2018-11-23 RX ADMIN — Medication 105 MILLIGRAM(S): at 22:08

## 2018-11-23 RX ADMIN — Medication 200 MILLIGRAM(S): at 17:39

## 2018-11-23 RX ADMIN — Medication 3 MILLILITER(S): at 05:41

## 2018-11-23 NOTE — PROGRESS NOTE ADULT - PROBLEM SELECTOR PLAN 3
discussed NPO status w/pt and son - they are ok waiting for bedside exam at this point but may choose pleasure feeds later on - no plan for NGT at this time. await S+S

## 2018-11-23 NOTE — PROGRESS NOTE ADULT - PROBLEM SELECTOR PLAN 5
CT head shows subacute stroke  start aspirin and atorvastatin  pt uninsured - will try to obtain MRIs here  physical therapy evaluation  check B12, folate, RPR

## 2018-11-23 NOTE — PROGRESS NOTE ADULT - SUBJECTIVE AND OBJECTIVE BOX
Authored by Dr René Chong [phone]  After hours or if no response please page Hospitalist service [phone]    Patient is a 73y old  Female who presents with a chief complaint of chest pain and SOB (2018 22:42)    SUBJECTIVE / OVERNIGHT EVENTS: Son at bedside for translation from Bulgarian at pt request. She continues to feel L sided weakness. When she swallows she seems like food is stuck in chest. Some SOB no CP but has some L flank/lower rib pain. Is very hungry.     MEDICATIONS  (STANDING):  acetaminophen  IVPB .. 1000 milliGRAM(s) IV Intermittent once  ALBUTerol/ipratropium for Nebulization 3 milliLiter(s) Nebulizer every 6 hours  aspirin enteric coated 81 milliGRAM(s) Oral daily  atorvastatin 80 milliGRAM(s) Oral at bedtime  buDESOnide  80 MICROgram(s)/formoterol 4.5 MICROgram(s) Inhaler 2 Puff(s) Inhalation two times a day  enoxaparin Injectable 40 milliGRAM(s) SubCutaneous every 24 hours  guaiFENesin    Syrup 200 milliGRAM(s) Oral every 6 hours  influenza   Vaccine 0.5 milliLiter(s) IntraMuscular once  losartan 25 milliGRAM(s) Oral daily  QUEtiapine 25 milliGRAM(s) Oral daily  thiamine IVPB 500 milliGRAM(s) IV Intermittent every 8 hours    MEDICATIONS  (PRN):      Vital Signs Last 24 Hrs  T(C): 36.9 (2018 06:53), Max: 37.4 (2018 13:21)  T(F): 98.5 (2018 06:53), Max: 99.4 (2018 13:21)  HR: 76 (2018 06:53) (70 - 96)  BP: 165/80 (2018 06:53) (131/69 - 165/80)  BP(mean): --  RR: 20 (2018 06:53) (16 - 20)  SpO2: 98% (2018 06:53) (93% - 100%)  CAPILLARY BLOOD GLUCOSE      POCT Blood Glucose.: 87 mg/dL (2018 05:47)    I&O's Summary      PHYSICAL EXAM  GENERAL: NAD, well-developed  EYES: conjunctiva and sclera clear  NECK: Supple, No JVD  ENT: MMM  CHEST/LUNG: coarse breath sounds occ scattered end exp wheezing  HEART: Regular rate and rhythm; No murmurs  ABDOMEN: Soft, Nontender, Nondistended; Bowel sounds present  EXTREMITIES:  2+ Peripheral Pulses, No clubbing, cyanosis, or edema  NEURO: AOx3 - no focal weakness observed, no pronator drift  PSYCH: calm   SKIN: No rashes or lesions    LABS:                        10.9   6.64  )-----------( 296      ( 2018 08:17 )             33.8         140  |  98  |  9   ----------------------------<  87  4.1   |  29  |  0.69    Ca    9.8      2018 06:09  Phos  5.1       Mg     2.0         TPro  7.1  /  Alb  3.9  /  TBili  0.4  /  DBili  x   /  AST  13  /  ALT  12  /  AlkPhos  81      PT/INR - ( 2018 14:41 )   PT: 13.2 sec;   INR: 1.14 ratio         PTT - ( 2018 14:41 )  PTT:31.4 sec  CARDIAC MARKERS ( 2018 14:41 )  x     / x     / 32 U/L / x     / x        Urinalysis Basic - ( 2018 15:16 )    Color: Light Yellow / Appearance: Clear / S.008 / pH: x  Gluc: x / Ketone: Negative  / Bili: Negative / Urobili: Negative   Blood: x / Protein: Negative / Nitrite: Negative   Leuk Esterase: Negative / RBC: x / WBC x   Sq Epi: x / Non Sq Epi: x / Bacteria: x    Troponin T, High Sensitivity Result: 9 ng/L (18 @ 16:19)  Troponin T, High Sensitivity Result: 11 ng/L (18 @ 14:41)    RADIOLOGY & ADDITIONAL TESTS:    Imaging Personally Reviewed: CTs reviewed  Consultant(s) Notes Reviewed:  neuro  Care Discussed with Consultants/Other Providers: plan d/w KARTHIKEYAN Arechiga about dysphagia

## 2018-11-23 NOTE — PROGRESS NOTE ADULT - ASSESSMENT
72 yo [facility] female with PMH of HTN, HLD, CHF, ?depression, DM (diet controlled), arthritis, presents here with chest pain and SOB.

## 2018-11-23 NOTE — ED ADULT NURSE REASSESSMENT NOTE - NS ED NURSE REASSESS COMMENT FT1
0245- patient maintained on contact isolation for RSV. Family made aware & instructed about good handwashing. Verbalized understanding.

## 2018-11-23 NOTE — PROGRESS NOTE ADULT - PROBLEM SELECTOR PLAN 6
patient was on lasix and aldactone at home, which will hold for now  patient appears to be euvolemic  check echocardiogram  consider adding beta blocker given concern of ischemia

## 2018-11-23 NOTE — ED ADULT NURSE REASSESSMENT NOTE - NS ED NURSE REASSESS COMMENT FT1
0315- patient OOB to bedside toilet with slow steady gait. Supervised for safety. Activity well tolerated with no sob or chest discomfort. NPO reinforced.

## 2018-11-24 LAB
ANION GAP SERPL CALC-SCNC: 12 MMOL/L — SIGNIFICANT CHANGE UP (ref 5–17)
BUN SERPL-MCNC: 12 MG/DL — SIGNIFICANT CHANGE UP (ref 7–23)
CALCIUM SERPL-MCNC: 9.5 MG/DL — SIGNIFICANT CHANGE UP (ref 8.4–10.5)
CHLORIDE SERPL-SCNC: 100 MMOL/L — SIGNIFICANT CHANGE UP (ref 96–108)
CO2 SERPL-SCNC: 28 MMOL/L — SIGNIFICANT CHANGE UP (ref 22–31)
CREAT SERPL-MCNC: 0.73 MG/DL — SIGNIFICANT CHANGE UP (ref 0.5–1.3)
GLUCOSE BLDC GLUCOMTR-MCNC: 111 MG/DL — HIGH (ref 70–99)
GLUCOSE BLDC GLUCOMTR-MCNC: 71 MG/DL — SIGNIFICANT CHANGE UP (ref 70–99)
GLUCOSE BLDC GLUCOMTR-MCNC: 79 MG/DL — SIGNIFICANT CHANGE UP (ref 70–99)
GLUCOSE SERPL-MCNC: 68 MG/DL — LOW (ref 70–99)
PHOSPHATE SERPL-MCNC: 4.2 MG/DL — SIGNIFICANT CHANGE UP (ref 2.5–4.5)
POTASSIUM SERPL-MCNC: 4.1 MMOL/L — SIGNIFICANT CHANGE UP (ref 3.5–5.3)
POTASSIUM SERPL-SCNC: 4.1 MMOL/L — SIGNIFICANT CHANGE UP (ref 3.5–5.3)
SODIUM SERPL-SCNC: 140 MMOL/L — SIGNIFICANT CHANGE UP (ref 135–145)

## 2018-11-24 PROCEDURE — 99231 SBSQ HOSP IP/OBS SF/LOW 25: CPT

## 2018-11-24 PROCEDURE — 99233 SBSQ HOSP IP/OBS HIGH 50: CPT

## 2018-11-24 RX ORDER — GLUCAGON INJECTION, SOLUTION 0.5 MG/.1ML
1 INJECTION, SOLUTION SUBCUTANEOUS ONCE
Qty: 0 | Refills: 0 | Status: DISCONTINUED | OUTPATIENT
Start: 2018-11-24 | End: 2018-11-26

## 2018-11-24 RX ORDER — DEXTROSE 50 % IN WATER 50 %
12.5 SYRINGE (ML) INTRAVENOUS ONCE
Qty: 0 | Refills: 0 | Status: DISCONTINUED | OUTPATIENT
Start: 2018-11-24 | End: 2018-11-26

## 2018-11-24 RX ORDER — SODIUM CHLORIDE 9 MG/ML
1000 INJECTION, SOLUTION INTRAVENOUS
Qty: 0 | Refills: 0 | Status: DISCONTINUED | OUTPATIENT
Start: 2018-11-24 | End: 2018-11-26

## 2018-11-24 RX ORDER — DEXTROSE 50 % IN WATER 50 %
15 SYRINGE (ML) INTRAVENOUS ONCE
Qty: 0 | Refills: 0 | Status: DISCONTINUED | OUTPATIENT
Start: 2018-11-24 | End: 2018-11-26

## 2018-11-24 RX ORDER — DEXTROSE 50 % IN WATER 50 %
25 SYRINGE (ML) INTRAVENOUS ONCE
Qty: 0 | Refills: 0 | Status: DISCONTINUED | OUTPATIENT
Start: 2018-11-24 | End: 2018-11-26

## 2018-11-24 RX ADMIN — Medication 3 MILLILITER(S): at 06:02

## 2018-11-24 RX ADMIN — ATORVASTATIN CALCIUM 80 MILLIGRAM(S): 80 TABLET, FILM COATED ORAL at 23:33

## 2018-11-24 RX ADMIN — SODIUM CHLORIDE 75 MILLILITER(S): 9 INJECTION INTRAMUSCULAR; INTRAVENOUS; SUBCUTANEOUS at 10:00

## 2018-11-24 RX ADMIN — Medication 200 MILLIGRAM(S): at 23:38

## 2018-11-24 RX ADMIN — BUDESONIDE AND FORMOTEROL FUMARATE DIHYDRATE 2 PUFF(S): 160; 4.5 AEROSOL RESPIRATORY (INHALATION) at 06:02

## 2018-11-24 RX ADMIN — Medication 3 MILLILITER(S): at 23:33

## 2018-11-24 RX ADMIN — Medication 200 MILLIGRAM(S): at 17:20

## 2018-11-24 RX ADMIN — Medication 105 MILLIGRAM(S): at 19:15

## 2018-11-24 RX ADMIN — Medication 105 MILLIGRAM(S): at 06:01

## 2018-11-24 RX ADMIN — Medication 200 MILLIGRAM(S): at 06:01

## 2018-11-24 RX ADMIN — Medication 200 MILLIGRAM(S): at 12:24

## 2018-11-24 RX ADMIN — Medication 3 MILLILITER(S): at 17:19

## 2018-11-24 RX ADMIN — LOSARTAN POTASSIUM 25 MILLIGRAM(S): 100 TABLET, FILM COATED ORAL at 06:01

## 2018-11-24 RX ADMIN — ENOXAPARIN SODIUM 40 MILLIGRAM(S): 100 INJECTION SUBCUTANEOUS at 06:02

## 2018-11-24 RX ADMIN — Medication 3 MILLILITER(S): at 12:24

## 2018-11-24 RX ADMIN — QUETIAPINE FUMARATE 25 MILLIGRAM(S): 200 TABLET, FILM COATED ORAL at 12:24

## 2018-11-24 RX ADMIN — Medication 81 MILLIGRAM(S): at 12:24

## 2018-11-24 RX ADMIN — BUDESONIDE AND FORMOTEROL FUMARATE DIHYDRATE 2 PUFF(S): 160; 4.5 AEROSOL RESPIRATORY (INHALATION) at 17:19

## 2018-11-24 NOTE — SWALLOW BEDSIDE ASSESSMENT ADULT - SWALLOW EVAL: DIAGNOSIS
Pt presents with mild oral stage dysphagia, reports that pharyngeal and esophageal stage issues have resolved. No overt s/s aspiration/laryngeal penetration noted and no c/o stasis.

## 2018-11-24 NOTE — SWALLOW BEDSIDE ASSESSMENT ADULT - CONSISTENCIES ADMINISTERED
puree/puree thin nectar thick consumed 12 ounces/thin liquid cookie/hard solid consumed portion of meal that son prepared- including rice and spiced vegetables/soft solid

## 2018-11-24 NOTE — PHYSICAL THERAPY INITIAL EVALUATION ADULT - PRECAUTIONS/LIMITATIONS, REHAB EVAL
2L of O2 via nasal cannula./swallowing precautions/isolation precautions/oxygen therapy device and L/min

## 2018-11-24 NOTE — PROGRESS NOTE ADULT - ASSESSMENT
Impression: Unclear cause of symptoms, but acute stroke as been ruled out. Symptoms could be 2/2 infection.    Plan:  -no further neurological workup required at this time.      Neurology will sign off. Please call with questions. Impression: Unclear cause of symptoms, but acute stroke as been ruled out as MRI is negative. Symptoms could be 2/2 infection.    Plan:  -no further neurological workup required at this time.      Neurology will sign off. Please call with questions. Impression: Unclear cause of symptoms, but acute stroke as been ruled out as MRI is negative, and she had no weakness or other neurologic findings today    Plan:  -no further neurological workup required at this time.      Neurology will sign off. Please call with questions.

## 2018-11-24 NOTE — SWALLOW BEDSIDE ASSESSMENT ADULT - ORAL PHASE
Within functional limits Delayed oral transit time/Oral transit time noted to be 15  seconds. mildly delayed prep and transit

## 2018-11-24 NOTE — OCCUPATIONAL THERAPY INITIAL EVALUATION ADULT - PERTINENT HX OF CURRENT PROBLEM, REHAB EVAL
74 yo Yoruba F, A+O x 3, but poor historian.  She just arrived from [facility] yesterday, today has been having SOB and chest pain. Per son, patient was admitted to hospital in [facility] with MI, but had no stent placement.  After 2 days of ICU stay, patient became altered, could not recognize family members.  She also had left arm weakness.  She was diagnosed with CVA.  Hospitalization complicated by pneumonia and pulmonary edema.  Pt D/[facility] after 12 day hospital stay.

## 2018-11-24 NOTE — PROGRESS NOTE ADULT - PROBLEM SELECTOR PLAN 5
-CT head shows subacute stroke  -continue with aspirin and atorvastatin  -MRI consistent with mircrovascular disease   -physical therapy evaluation  -seen by neurology, no further neuro workup inpatient

## 2018-11-24 NOTE — SWALLOW BEDSIDE ASSESSMENT ADULT - ASR SWALLOW ASPIRATION MONITOR
cough/gurgly voice/fever/pneumonia/throat clearing/upper respiratory infection/change of breathing pattern/Monitor for s/s aspiration/laryngeal penetration. If noted:  D/C p.o. intake, provide non-oral nutrition/hydration/meds, and contact this service @ [phone]

## 2018-11-24 NOTE — SWALLOW BEDSIDE ASSESSMENT ADULT - PHARYNGEAL PHASE
denied stasis/Within functional limits Within functional limits reported "mild burning in mouth and pharynx" - son stated that food was very spicy/Within functional limits

## 2018-11-24 NOTE — PHYSICAL THERAPY INITIAL EVALUATION ADULT - ADDITIONAL COMMENTS
Pt A&Ox3 but poor historian. Son present in room and provided translation. Pt's son stated that pt came to [place] from [facility] for medical treatment. Pt will be living with son in 1 level private home upon discharge. Pt was independent with functional activities and ambulated without an assistive device.

## 2018-11-24 NOTE — OCCUPATIONAL THERAPY INITIAL EVALUATION ADULT - ADL RETRAINING, OT EVAL
Patient will dress lower body independently with AE as needed in 2 weeks. Patient will dress upper body independently within 2 weeks

## 2018-11-24 NOTE — PROGRESS NOTE ADULT - PROBLEM SELECTOR PLAN 2
-Patient with pleuritic chest pain, that is worse with cough, likely due to viral URI  troponin 11 --> 9  -EKG shows no ST changes  -Patient with likely CAD, previous history of ?MI  -continue with aspirin and atorvastatin  -echocardiogram pending

## 2018-11-24 NOTE — PROGRESS NOTE ADULT - SUBJECTIVE AND OBJECTIVE BOX
Patient is a 73y old  Female who presents with a chief complaint of chest pain and SOB (2018 10:30)    SUBJECTIVE / OVERNIGHT EVENTS: no acute events overnight, patient's breathing has improved. Translated via patient's son who was at bedside.     MEDICATIONS  (STANDING):  acetaminophen  IVPB .. 1000 milliGRAM(s) IV Intermittent once  ALBUTerol/ipratropium for Nebulization 3 milliLiter(s) Nebulizer every 6 hours  aspirin enteric coated 81 milliGRAM(s) Oral daily  atorvastatin 80 milliGRAM(s) Oral at bedtime  buDESOnide  80 MICROgram(s)/formoterol 4.5 MICROgram(s) Inhaler 2 Puff(s) Inhalation two times a day  enoxaparin Injectable 40 milliGRAM(s) SubCutaneous every 24 hours  guaiFENesin    Syrup 200 milliGRAM(s) Oral every 6 hours  influenza   Vaccine 0.5 milliLiter(s) IntraMuscular once  losartan 25 milliGRAM(s) Oral daily  QUEtiapine 25 milliGRAM(s) Oral daily  thiamine IVPB 500 milliGRAM(s) IV Intermittent every 8 hours    MEDICATIONS  (PRN):      Vital Signs Last 24 Hrs  T(C): 36.6 (2018 11:31), Max: 36.6 (2018 21:48)  T(F): 97.8 (2018 11:31), Max: 97.8 (2018 21:48)  HR: 80 (2018 11:31) (74 - 84)  BP: 157/80 (2018 11:31) (130/77 - 157/80)  BP(mean): --  RR: 17 (2018 11:31) (17 - 18)  SpO2: 93% (2018 11:31) (93% - 98%)  CAPILLARY BLOOD GLUCOSE      POCT Blood Glucose.: 71 mg/dL (2018 11:55)  POCT Blood Glucose.: 111 mg/dL (2018 06:34)  POCT Blood Glucose.: 88 mg/dL (2018 23:53)  POCT Blood Glucose.: 120 mg/dL (2018 17:48)    I&O's Summary    2018 07:01  -  2018 07:00  --------------------------------------------------------  IN: 0 mL / OUT: 0 mL / NET: 0 mL    2018 07:01  -  2018 13:13  --------------------------------------------------------  IN: 0 mL / OUT: 0 mL / NET: 0 mL    PHYSICAL EXAM:  GENERAL: NAD, resting in bed   EYES: EOMI, conjunctiva and sclera clear  NECK: Supple, No JVD  CHEST/LUNG: mild scattered wheezing b/l   HEART: Regular rate and rhythm; +S1/S2  ABDOMEN: Soft, Nontender, Nondistended; Bowel sounds present  EXTREMITIES:  no peripheral edema   PSYCH: AAOx3      LABS:                        10.9   6.64  )-----------( 296      ( 2018 08:17 )             33.8     11-24    140  |  100  |  12  ----------------------------<  68<L>  4.1   |  28  |  0.73    Ca    9.5      2018 07:12  Phos  4.2       Mg     2.0         TPro  7.1  /  Alb  3.9  /  TBili  0.4  /  DBili  x   /  AST  13  /  ALT  12  /  AlkPhos  81      PT/INR - ( 2018 14:41 )   PT: 13.2 sec;   INR: 1.14 ratio         PTT - ( 2018 14:41 )  PTT:31.4 sec  CARDIAC MARKERS ( 2018 14:41 )  x     / x     / 32 U/L / x     / x          Urinalysis Basic - ( 2018 15:16 )    Color: Light Yellow / Appearance: Clear / S.008 / pH: x  Gluc: x / Ketone: Negative  / Bili: Negative / Urobili: Negative   Blood: x / Protein: Negative / Nitrite: Negative   Leuk Esterase: Negative / RBC: x / WBC x   Sq Epi: x / Non Sq Epi: x / Bacteria: x        Consultant(s) Notes Reviewed:  Neurology

## 2018-11-24 NOTE — SWALLOW BEDSIDE ASSESSMENT ADULT - SLP GENERAL OBSERVATIONS
Pt Macedonian speaking - utilized  and son for translation purposes. Pt stated "I feel weak" and son reported no p.o. in almost a week and anxious for eval. Son served as primary informant and reported that pt in [facility] and was in ICU for MI. When questioned re: stroke, son denied. Did endorse that family had been providing pt with soft and pureed foods while in hospital . No dysphagia w/u while there. Stated that pt would state that "food sticks" ,denied s/s aspiration

## 2018-11-24 NOTE — SWALLOW BEDSIDE ASSESSMENT ADULT - ORAL PREPARATORY PHASE
Refuses to accept bolus into oral cavity/disliked flavor disliked flavor, but consumed 4 oz/Refuses to accept bolus into oral cavity Within functional limits stated that she disliked flavor/Refuses to accept bolus into oral cavity

## 2018-11-24 NOTE — SWALLOW BEDSIDE ASSESSMENT ADULT - SWALLOW EVAL: ORAL MUSCULATURE
very poor dental hygiene, black teeth - chews tobacco and poor care, face symmetrical, tongue midline/anomalies present

## 2018-11-24 NOTE — OCCUPATIONAL THERAPY INITIAL EVALUATION ADULT - ADDITIONAL COMMENTS
MR Head 11/23- Microvascular disease. No acute or subacute infarction. No abnormal intracranial enhancement. Moderate to severe inflammatory paranasal sinus disease.Intracranial MR angiography demonstrates no significant stenosis, evidence for aneurysm, or vascular malformation. Extracranial MR angiography demonstrates no flow-limiting stenosis by NASCET criteria.  CT Chest 11/22- Limited evaluation for segmental and subsegmental pulmonary embolism. No main, left, right, or lobar pulmonary embolism. Infiltration of the periumbilical fat likely related to subcutaneous injection. No acute intra-abdominal pathology or pelvic.  CT Head 11/22- Hypodensity within the anterior limb of the right internal capsule may represent an acute/subacute lacunar infarct. No acute intracranial hemorrhage. MR Head 11/23- Microvascular disease. No acute or subacute infarction. No abnormal intracranial enhancement. Moderate to severe inflammatory paranasal sinus disease. Intracranial MR angiography demonstrates no significant stenosis, evidence for aneurysm, or vascular malformation. Extracranial MR angiography demonstrates no flow-limiting stenosis by NASCET criteria.  CT Chest 11/22- Limited evaluation for segmental and subsegmental pulmonary embolism. No main, left, right, or lobar pulmonary embolism. Infiltration of the periumbilical fat likely related to subcutaneous injection. No acute intra-abdominal pathology or pelvic.  CT Head 11/22- Hypodensity within the anterior limb of the right internal capsule may represent an acute/subacute lacunar infarct. No acute intracranial hemorrhage.

## 2018-11-24 NOTE — OCCUPATIONAL THERAPY INITIAL EVALUATION ADULT - DIAGNOSIS, OT EVAL
Patient with decreased ADL status and impairments with functional mobility due to Patient with decreased ADL status and impairments with functional mobility due to decreased balance, strength, and endurance

## 2018-11-24 NOTE — PROGRESS NOTE ADULT - SUBJECTIVE AND OBJECTIVE BOX
Neurology Follow up note    Patient is a 73y old  Female who presents with a chief complaint of chest pain and SOB (23 Nov 2018 10:39)      Subjective: Interval History - No events overnight    Objective:   Vital Signs Last 24 Hrs  T(C): 36.4 (24 Nov 2018 05:59), Max: 36.7 (23 Nov 2018 11:55)  T(F): 97.6 (24 Nov 2018 05:59), Max: 98.1 (23 Nov 2018 11:55)  HR: 84 (24 Nov 2018 05:59) (73 - 84)  BP: 150/83 (24 Nov 2018 05:59) (130/77 - 152/78)  BP(mean): --  RR: 18 (24 Nov 2018 05:59) (18 - 19)  SpO2: 97% (24 Nov 2018 05:59) (97% - 98%)    Neurological Exam:  Mental Status: Orientated to self, date and place.  Attention intact.  No dysarthria, aphasia or neglect.     Cranial Nerves:  EOMI, VFF, no nystagmus or diplopia.  No APD.    CN V1-3 intact to light touch and pinprick.  No facial asymmetry.  Hearing intact to finger rub bilaterally.  Tongue, uvula and palate midline.  Sternocleidomastoid and Trapezius intact bilaterally.    Motor:   Tone: normal.                  Strength: intact throughout  Pronator drift: none                 Dysmeria: None to finger-nose-finger or heel-shin-heel  No truncal ataxia.    Tremor: No resting, postural or action tremor.  No myoclonus.    Sensation: intact to light touch, pinprick, vibration and proprioception    Deep Tendon Reflexes: 1+ bilateral biceps, triceps, brachioradialis, knee and ankle  Toes flexor bilaterally    Gait: normal and stable.      Other:    11-24    140  |  100  |  12  ----------------------------<  68<L>  4.1   |  28  |  0.73    Ca    9.5      24 Nov 2018 07:12  Phos  4.2     11-24  Mg     2.0     11-23    TPro  7.1  /  Alb  3.9  /  TBili  0.4  /  DBili  x   /  AST  13  /  ALT  12  /  AlkPhos  81  11-23 11-24    140  |  100  |  12  ----------------------------<  68<L>  4.1   |  28  |  0.73    Ca    9.5      24 Nov 2018 07:12  Phos  4.2     11-24  Mg     2.0     11-23    TPro  7.1  /  Alb  3.9  /  TBili  0.4  /  DBili  x   /  AST  13  /  ALT  12  /  AlkPhos  81  11-23    LIVER FUNCTIONS - ( 23 Nov 2018 06:09 )  Alb: 3.9 g/dL / Pro: 7.1 g/dL / ALK PHOS: 81 U/L / ALT: 12 U/L / AST: 13 U/L / GGT: x                                 10.9   6.64  )-----------( 296      ( 23 Nov 2018 08:17 )             33.8     Radiology    EKG:  tele:  TTE:  EEG:      MEDICATIONS  (STANDING):  acetaminophen  IVPB .. 1000 milliGRAM(s) IV Intermittent once  ALBUTerol/ipratropium for Nebulization 3 milliLiter(s) Nebulizer every 6 hours  aspirin enteric coated 81 milliGRAM(s) Oral daily  atorvastatin 80 milliGRAM(s) Oral at bedtime  buDESOnide  80 MICROgram(s)/formoterol 4.5 MICROgram(s) Inhaler 2 Puff(s) Inhalation two times a day  enoxaparin Injectable 40 milliGRAM(s) SubCutaneous every 24 hours  guaiFENesin    Syrup 200 milliGRAM(s) Oral every 6 hours  influenza   Vaccine 0.5 milliLiter(s) IntraMuscular once  losartan 25 milliGRAM(s) Oral daily  QUEtiapine 25 milliGRAM(s) Oral daily  sodium chloride 0.9%. 1000 milliLiter(s) (75 mL/Hr) IV Continuous <Continuous>  thiamine IVPB 500 milliGRAM(s) IV Intermittent every 8 hours    MEDICATIONS  (PRN): Neurology Follow up note    Patient is a 73y old  Female who presents with a chief complaint of chest pain and SOB (23 Nov 2018 10:39)      Subjective: Interval History - No events overnight    Objective:   Vital Signs Last 24 Hrs  T(C): 36.4 (24 Nov 2018 05:59), Max: 36.7 (23 Nov 2018 11:55)  T(F): 97.6 (24 Nov 2018 05:59), Max: 98.1 (23 Nov 2018 11:55)  HR: 84 (24 Nov 2018 05:59) (73 - 84)  BP: 150/83 (24 Nov 2018 05:59) (130/77 - 152/78)  BP(mean): --  RR: 18 (24 Nov 2018 05:59) (18 - 19)  SpO2: 97% (24 Nov 2018 05:59) (97% - 98%)    Neurological Exam:  Mental Status: Orientated to self, date and place.  Attention intact.  No dysarthria, aphasia or neglect.     Cranial Nerves:  EOMI, VFF, no nystagmus or diplopia. No facial asymmetry.                   Strength: 5/5 UE, moves LE b/l  Pronator drift: none                 Dysmetria: None to finger-nose-finger  No truncal ataxia.    Tremor: No resting, postural or action tremor.  No myoclonus.  Sensation: intact to light touch    Babinski positive on Left    Other:    11-24    140  |  100  |  12  ----------------------------<  68<L>  4.1   |  28  |  0.73    Ca    9.5      24 Nov 2018 07:12  Phos  4.2     11-24  Mg     2.0     11-23    TPro  7.1  /  Alb  3.9  /  TBili  0.4  /  DBili  x   /  AST  13  /  ALT  12  /  AlkPhos  81  11-23 11-24    140  |  100  |  12  ----------------------------<  68<L>  4.1   |  28  |  0.73    Ca    9.5      24 Nov 2018 07:12  Phos  4.2     11-24  Mg     2.0     11-23    TPro  7.1  /  Alb  3.9  /  TBili  0.4  /  DBili  x   /  AST  13  /  ALT  12  /  AlkPhos  81  11-23    LIVER FUNCTIONS - ( 23 Nov 2018 06:09 )  Alb: 3.9 g/dL / Pro: 7.1 g/dL / ALK PHOS: 81 U/L / ALT: 12 U/L / AST: 13 U/L / GGT: x                                 10.9   6.64  )-----------( 296      ( 23 Nov 2018 08:17 )             33.8     Radiology    EKG:  tele:  TTE:  EEG:      MEDICATIONS  (STANDING):  acetaminophen  IVPB .. 1000 milliGRAM(s) IV Intermittent once  ALBUTerol/ipratropium for Nebulization 3 milliLiter(s) Nebulizer every 6 hours  aspirin enteric coated 81 milliGRAM(s) Oral daily  atorvastatin 80 milliGRAM(s) Oral at bedtime  buDESOnide  80 MICROgram(s)/formoterol 4.5 MICROgram(s) Inhaler 2 Puff(s) Inhalation two times a day  enoxaparin Injectable 40 milliGRAM(s) SubCutaneous every 24 hours  guaiFENesin    Syrup 200 milliGRAM(s) Oral every 6 hours  influenza   Vaccine 0.5 milliLiter(s) IntraMuscular once  losartan 25 milliGRAM(s) Oral daily  QUEtiapine 25 milliGRAM(s) Oral daily  sodium chloride 0.9%. 1000 milliLiter(s) (75 mL/Hr) IV Continuous <Continuous>  thiamine IVPB 500 milliGRAM(s) IV Intermittent every 8 hours    MEDICATIONS  (PRN):

## 2018-11-24 NOTE — OCCUPATIONAL THERAPY INITIAL EVALUATION ADULT - PRECAUTIONS/LIMITATIONS, REHAB EVAL
surgical precautions/fall precautions/She was D/[place] on diuretics and abx. Since D/C, pt weak, not able to walk without assistance, always feels like she would fall.  She has been having cough without sputum production.  States cough is improved compared to 3 weeks ago.  She also has left sided chest pain, which is always present, now getting worse.  She describes it as constant, worse with breathing and movement, reproducible with palpation.  Chest pain is worse with cough. She also has been having fever daily, as high as 102 along with chills.  Additionally, son states that since the stroke, patient has been having trouble swallowing regular food, now on pureed diet, still complains of food and water getting stuck when swallowing.

## 2018-11-24 NOTE — SWALLOW BEDSIDE ASSESSMENT ADULT - COMMENTS
Denied residue denied residue denied stasis. Overall, for all consistencies tested, denied dysphagia. Stated that problems resolved. Son stated "this is much better!"

## 2018-11-24 NOTE — PHYSICAL THERAPY INITIAL EVALUATION ADULT - GENERAL OBSERVATIONS, REHAB EVAL
Pt received semi supine in bed. IV. Tele. Contact Isolation. Droplet precautions. IV. Pt is English speaking. Refused phone and son in room to provide translation.

## 2018-11-24 NOTE — PROGRESS NOTE ADULT - PROBLEM SELECTOR PLAN 6
-patient was on lasix and aldactone at home, which will hold for now  -patient appears to be euvolemic  -f/u echocardiogram  consider adding beta blocker given concern of ischemia

## 2018-11-24 NOTE — PROGRESS NOTE ADULT - ASSESSMENT
72 yo [facility] female with PMH of HTN, HLD, CHF, ?depression, DM (diet controlled), arthritis, presents here with chest pain and SOB.

## 2018-11-25 LAB
ANION GAP SERPL CALC-SCNC: 14 MMOL/L — SIGNIFICANT CHANGE UP (ref 5–17)
BUN SERPL-MCNC: 9 MG/DL — SIGNIFICANT CHANGE UP (ref 7–23)
CALCIUM SERPL-MCNC: 9.7 MG/DL — SIGNIFICANT CHANGE UP (ref 8.4–10.5)
CHLORIDE SERPL-SCNC: 100 MMOL/L — SIGNIFICANT CHANGE UP (ref 96–108)
CO2 SERPL-SCNC: 26 MMOL/L — SIGNIFICANT CHANGE UP (ref 22–31)
CREAT SERPL-MCNC: 0.75 MG/DL — SIGNIFICANT CHANGE UP (ref 0.5–1.3)
GLUCOSE BLDC GLUCOMTR-MCNC: 118 MG/DL — HIGH (ref 70–99)
GLUCOSE BLDC GLUCOMTR-MCNC: 124 MG/DL — HIGH (ref 70–99)
GLUCOSE BLDC GLUCOMTR-MCNC: 156 MG/DL — HIGH (ref 70–99)
GLUCOSE BLDC GLUCOMTR-MCNC: 173 MG/DL — HIGH (ref 70–99)
GLUCOSE SERPL-MCNC: 112 MG/DL — HIGH (ref 70–99)
HCT VFR BLD CALC: 35.1 % — SIGNIFICANT CHANGE UP (ref 34.5–45)
HGB BLD-MCNC: 10.9 G/DL — LOW (ref 11.5–15.5)
MCHC RBC-ENTMCNC: 27.7 PG — SIGNIFICANT CHANGE UP (ref 27–34)
MCHC RBC-ENTMCNC: 31.1 GM/DL — LOW (ref 32–36)
MCV RBC AUTO: 89.3 FL — SIGNIFICANT CHANGE UP (ref 80–100)
PLATELET # BLD AUTO: 305 K/UL — SIGNIFICANT CHANGE UP (ref 150–400)
POTASSIUM SERPL-MCNC: 4.2 MMOL/L — SIGNIFICANT CHANGE UP (ref 3.5–5.3)
POTASSIUM SERPL-SCNC: 4.2 MMOL/L — SIGNIFICANT CHANGE UP (ref 3.5–5.3)
RBC # BLD: 3.93 M/UL — SIGNIFICANT CHANGE UP (ref 3.8–5.2)
RBC # FLD: 14.3 % — SIGNIFICANT CHANGE UP (ref 10.3–14.5)
SODIUM SERPL-SCNC: 140 MMOL/L — SIGNIFICANT CHANGE UP (ref 135–145)
WBC # BLD: 6.13 K/UL — SIGNIFICANT CHANGE UP (ref 3.8–10.5)
WBC # FLD AUTO: 6.13 K/UL — SIGNIFICANT CHANGE UP (ref 3.8–10.5)

## 2018-11-25 PROCEDURE — 99233 SBSQ HOSP IP/OBS HIGH 50: CPT

## 2018-11-25 RX ORDER — INSULIN LISPRO 100/ML
VIAL (ML) SUBCUTANEOUS
Qty: 0 | Refills: 0 | Status: DISCONTINUED | OUTPATIENT
Start: 2018-11-25 | End: 2018-11-26

## 2018-11-25 RX ORDER — INSULIN LISPRO 100/ML
VIAL (ML) SUBCUTANEOUS AT BEDTIME
Qty: 0 | Refills: 0 | Status: DISCONTINUED | OUTPATIENT
Start: 2018-11-25 | End: 2018-11-26

## 2018-11-25 RX ADMIN — Medication 200 MILLIGRAM(S): at 05:23

## 2018-11-25 RX ADMIN — ATORVASTATIN CALCIUM 80 MILLIGRAM(S): 80 TABLET, FILM COATED ORAL at 21:10

## 2018-11-25 RX ADMIN — ENOXAPARIN SODIUM 40 MILLIGRAM(S): 100 INJECTION SUBCUTANEOUS at 05:23

## 2018-11-25 RX ADMIN — Medication 200 MILLIGRAM(S): at 12:42

## 2018-11-25 RX ADMIN — Medication 105 MILLIGRAM(S): at 21:11

## 2018-11-25 RX ADMIN — BUDESONIDE AND FORMOTEROL FUMARATE DIHYDRATE 2 PUFF(S): 160; 4.5 AEROSOL RESPIRATORY (INHALATION) at 05:23

## 2018-11-25 RX ADMIN — Medication 3 MILLILITER(S): at 12:42

## 2018-11-25 RX ADMIN — Medication 3 MILLILITER(S): at 17:15

## 2018-11-25 RX ADMIN — BUDESONIDE AND FORMOTEROL FUMARATE DIHYDRATE 2 PUFF(S): 160; 4.5 AEROSOL RESPIRATORY (INHALATION) at 17:16

## 2018-11-25 RX ADMIN — Medication 81 MILLIGRAM(S): at 12:42

## 2018-11-25 RX ADMIN — Medication 105 MILLIGRAM(S): at 09:05

## 2018-11-25 RX ADMIN — Medication 3 MILLILITER(S): at 05:23

## 2018-11-25 RX ADMIN — Medication 105 MILLIGRAM(S): at 17:15

## 2018-11-25 RX ADMIN — Medication 105 MILLIGRAM(S): at 01:53

## 2018-11-25 RX ADMIN — LOSARTAN POTASSIUM 25 MILLIGRAM(S): 100 TABLET, FILM COATED ORAL at 05:23

## 2018-11-25 RX ADMIN — QUETIAPINE FUMARATE 25 MILLIGRAM(S): 200 TABLET, FILM COATED ORAL at 12:42

## 2018-11-25 RX ADMIN — Medication 200 MILLIGRAM(S): at 17:15

## 2018-11-25 NOTE — PROGRESS NOTE ADULT - ASSESSMENT
72 yo [facility] female with PMH of HTN, HLD, CHF, ?depression, DM (diet controlled), arthritis, presents here with chest pain and SOB.

## 2018-11-25 NOTE — PROGRESS NOTE ADULT - PROBLEM SELECTOR PLAN 2
-patient clinically improving  -continue with duonebs  -continue with guaifenesin  -now off O2 at rest, check ambulatory O2 sat

## 2018-11-25 NOTE — PROGRESS NOTE ADULT - PROBLEM SELECTOR PLAN 3
-Patient with pleuritic chest pain, that is worse with cough, likely due to viral URI  troponin 11 --> 9  -EKG shows no ST changes  -Patient with likely CAD, previous history of ?MI  -continue with aspirin and atorvastatin  -echo done, grossly normal LV function

## 2018-11-25 NOTE — PROGRESS NOTE ADULT - SUBJECTIVE AND OBJECTIVE BOX
Patient is a 73y old  Female who presents with a chief complaint of chest pain and SOB (24 Nov 2018 13:13)    SUBJECTIVE / OVERNIGHT EVENTS: No acute events overnight, patient is off O2 and breathing comfortably.     MEDICATIONS  (STANDING):  acetaminophen  IVPB .. 1000 milliGRAM(s) IV Intermittent once  ALBUTerol/ipratropium for Nebulization 3 milliLiter(s) Nebulizer every 6 hours  aspirin enteric coated 81 milliGRAM(s) Oral daily  atorvastatin 80 milliGRAM(s) Oral at bedtime  buDESOnide  80 MICROgram(s)/formoterol 4.5 MICROgram(s) Inhaler 2 Puff(s) Inhalation two times a day  dextrose 5%. 1000 milliLiter(s) (50 mL/Hr) IV Continuous <Continuous>  dextrose 50% Injectable 12.5 Gram(s) IV Push once  dextrose 50% Injectable 25 Gram(s) IV Push once  dextrose 50% Injectable 25 Gram(s) IV Push once  enoxaparin Injectable 40 milliGRAM(s) SubCutaneous every 24 hours  guaiFENesin    Syrup 200 milliGRAM(s) Oral every 6 hours  influenza   Vaccine 0.5 milliLiter(s) IntraMuscular once  losartan 25 milliGRAM(s) Oral daily  QUEtiapine 25 milliGRAM(s) Oral daily  thiamine IVPB 500 milliGRAM(s) IV Intermittent every 8 hours    MEDICATIONS  (PRN):  dextrose 40% Gel 15 Gram(s) Oral once PRN Blood Glucose LESS THAN 70 milliGRAM(s)/deciliter  glucagon  Injectable 1 milliGRAM(s) IntraMuscular once PRN Glucose LESS THAN 70 milligrams/deciliter      Vital Signs Last 24 Hrs  T(C): 37.7 (25 Nov 2018 11:25), Max: 37.7 (25 Nov 2018 11:25)  T(F): 99.9 (25 Nov 2018 11:25), Max: 99.9 (25 Nov 2018 11:25)  HR: 88 (25 Nov 2018 11:25) (81 - 88)  BP: 137/80 (25 Nov 2018 11:25) (135/73 - 137/80)  BP(mean): --  RR: 17 (25 Nov 2018 11:25) (17 - 18)  SpO2: 93% (25 Nov 2018 11:25) (92% - 99%)  CAPILLARY BLOOD GLUCOSE      POCT Blood Glucose.: 124 mg/dL (25 Nov 2018 11:49)  POCT Blood Glucose.: 118 mg/dL (25 Nov 2018 07:54)  POCT Blood Glucose.: 79 mg/dL (24 Nov 2018 17:22)    I&O's Summary    24 Nov 2018 07:01  -  25 Nov 2018 07:00  --------------------------------------------------------  IN: 0 mL / OUT: 0 mL / NET: 0 mL    25 Nov 2018 07:01  -  25 Nov 2018 13:41  --------------------------------------------------------  IN: 240 mL / OUT: 0 mL / NET: 240 mL      PHYSICAL EXAM:  GENERAL: NAD  HEAD:  Atraumatic, Normocephalic  EYES: EOMI, conjunctiva and sclera clear  NECK: Supple, No JVD  CHEST/LUNG: Clear to auscultation bilaterally; No wheeze  HEART: Regular rate and rhythm, +S1/S2  ABDOMEN: Soft, Nontender, Nondistended; Bowel sounds present  EXTREMITIES:  no peripheral edema   PSYCH: AAOx3  NEUROLOGY: non-focal      LABS:                        10.9   6.13  )-----------( 305      ( 25 Nov 2018 08:32 )             35.1     11-25    140  |  100  |  9   ----------------------------<  112<H>  4.2   |  26  |  0.75    Ca    9.7      25 Nov 2018 06:52  Phos  4.2     11-24

## 2018-11-25 NOTE — PROGRESS NOTE ADULT - PROBLEM SELECTOR PLAN 6
-patient was on lasix and aldactone at home, which we will hold for now   -patient appears to be euvolemic

## 2018-11-26 ENCOUNTER — TRANSCRIPTION ENCOUNTER (OUTPATIENT)
Age: 73
End: 2018-11-26

## 2018-11-26 VITALS — WEIGHT: 137.79 LBS

## 2018-11-26 LAB
ANION GAP SERPL CALC-SCNC: 10 MMOL/L — SIGNIFICANT CHANGE UP (ref 5–17)
BUN SERPL-MCNC: 8 MG/DL — SIGNIFICANT CHANGE UP (ref 7–23)
CALCIUM SERPL-MCNC: 9.6 MG/DL — SIGNIFICANT CHANGE UP (ref 8.4–10.5)
CHLORIDE SERPL-SCNC: 103 MMOL/L — SIGNIFICANT CHANGE UP (ref 96–108)
CO2 SERPL-SCNC: 27 MMOL/L — SIGNIFICANT CHANGE UP (ref 22–31)
CREAT SERPL-MCNC: 0.73 MG/DL — SIGNIFICANT CHANGE UP (ref 0.5–1.3)
GLUCOSE BLDC GLUCOMTR-MCNC: 117 MG/DL — HIGH (ref 70–99)
GLUCOSE BLDC GLUCOMTR-MCNC: 170 MG/DL — HIGH (ref 70–99)
GLUCOSE SERPL-MCNC: 95 MG/DL — SIGNIFICANT CHANGE UP (ref 70–99)
HCT VFR BLD CALC: 35 % — SIGNIFICANT CHANGE UP (ref 34.5–45)
HGB BLD-MCNC: 11.1 G/DL — LOW (ref 11.5–15.5)
MCHC RBC-ENTMCNC: 28.6 PG — SIGNIFICANT CHANGE UP (ref 27–34)
MCHC RBC-ENTMCNC: 31.7 GM/DL — LOW (ref 32–36)
MCV RBC AUTO: 90.2 FL — SIGNIFICANT CHANGE UP (ref 80–100)
PLATELET # BLD AUTO: 310 K/UL — SIGNIFICANT CHANGE UP (ref 150–400)
POTASSIUM SERPL-MCNC: 4.2 MMOL/L — SIGNIFICANT CHANGE UP (ref 3.5–5.3)
POTASSIUM SERPL-SCNC: 4.2 MMOL/L — SIGNIFICANT CHANGE UP (ref 3.5–5.3)
RBC # BLD: 3.88 M/UL — SIGNIFICANT CHANGE UP (ref 3.8–5.2)
RBC # FLD: 14.5 % — SIGNIFICANT CHANGE UP (ref 10.3–14.5)
SODIUM SERPL-SCNC: 140 MMOL/L — SIGNIFICANT CHANGE UP (ref 135–145)
WBC # BLD: 6.01 K/UL — SIGNIFICANT CHANGE UP (ref 3.8–10.5)
WBC # FLD AUTO: 6.01 K/UL — SIGNIFICANT CHANGE UP (ref 3.8–10.5)

## 2018-11-26 PROCEDURE — 99239 HOSP IP/OBS DSCHRG MGMT >30: CPT

## 2018-11-26 RX ORDER — ESOMEPRAZOLE MAGNESIUM 40 MG/1
20 CAPSULE, DELAYED RELEASE ORAL
Qty: 0 | Refills: 0 | COMMUNITY

## 2018-11-26 RX ORDER — LOSARTAN POTASSIUM 100 MG/1
1 TABLET, FILM COATED ORAL
Qty: 30 | Refills: 0 | OUTPATIENT
Start: 2018-11-26 | End: 2018-12-25

## 2018-11-26 RX ORDER — ATORVASTATIN CALCIUM 80 MG/1
1 TABLET, FILM COATED ORAL
Qty: 30 | Refills: 0 | OUTPATIENT
Start: 2018-11-26 | End: 2018-12-25

## 2018-11-26 RX ORDER — ALBUTEROL 90 UG/1
1 AEROSOL, METERED ORAL
Qty: 0 | Refills: 0 | COMMUNITY

## 2018-11-26 RX ORDER — ALBUTEROL 90 UG/1
0 AEROSOL, METERED ORAL
Qty: 0 | Refills: 0 | COMMUNITY

## 2018-11-26 RX ORDER — BUDESONIDE AND FORMOTEROL FUMARATE DIHYDRATE 160; 4.5 UG/1; UG/1
2 AEROSOL RESPIRATORY (INHALATION)
Qty: 1 | Refills: 0 | OUTPATIENT
Start: 2018-11-26 | End: 2018-12-25

## 2018-11-26 RX ORDER — ATORVASTATIN CALCIUM 80 MG/1
1 TABLET, FILM COATED ORAL
Qty: 0 | Refills: 0 | COMMUNITY
Start: 2018-11-26

## 2018-11-26 RX ORDER — ASPIRIN/CALCIUM CARB/MAGNESIUM 324 MG
1 TABLET ORAL
Qty: 30 | Refills: 0 | OUTPATIENT
Start: 2018-11-26 | End: 2018-12-25

## 2018-11-26 RX ORDER — SPIRONOLACTONE 25 MG/1
0 TABLET, FILM COATED ORAL
Qty: 0 | Refills: 0 | COMMUNITY

## 2018-11-26 RX ORDER — LOSARTAN POTASSIUM 100 MG/1
1 TABLET, FILM COATED ORAL
Qty: 0 | Refills: 0 | COMMUNITY
Start: 2018-11-26

## 2018-11-26 RX ORDER — BUDESONIDE AND FORMOTEROL FUMARATE DIHYDRATE 160; 4.5 UG/1; UG/1
2 AEROSOL RESPIRATORY (INHALATION)
Qty: 0 | Refills: 0 | COMMUNITY
Start: 2018-11-26

## 2018-11-26 RX ORDER — ASPIRIN/CALCIUM CARB/MAGNESIUM 324 MG
1 TABLET ORAL
Qty: 0 | Refills: 0 | COMMUNITY
Start: 2018-11-26

## 2018-11-26 RX ORDER — QUETIAPINE FUMARATE 200 MG/1
1 TABLET, FILM COATED ORAL
Qty: 30 | Refills: 0 | OUTPATIENT
Start: 2018-11-26 | End: 2018-12-25

## 2018-11-26 RX ORDER — ALBUTEROL 90 UG/1
1 AEROSOL, METERED ORAL
Qty: 1 | Refills: 0 | OUTPATIENT
Start: 2018-11-26

## 2018-11-26 RX ADMIN — Medication 81 MILLIGRAM(S): at 12:52

## 2018-11-26 RX ADMIN — BUDESONIDE AND FORMOTEROL FUMARATE DIHYDRATE 2 PUFF(S): 160; 4.5 AEROSOL RESPIRATORY (INHALATION) at 05:08

## 2018-11-26 RX ADMIN — Medication 105 MILLIGRAM(S): at 05:08

## 2018-11-26 RX ADMIN — Medication 200 MILLIGRAM(S): at 05:08

## 2018-11-26 RX ADMIN — ENOXAPARIN SODIUM 40 MILLIGRAM(S): 100 INJECTION SUBCUTANEOUS at 05:08

## 2018-11-26 RX ADMIN — Medication 3 MILLILITER(S): at 08:21

## 2018-11-26 RX ADMIN — Medication 200 MILLIGRAM(S): at 01:56

## 2018-11-26 RX ADMIN — LOSARTAN POTASSIUM 25 MILLIGRAM(S): 100 TABLET, FILM COATED ORAL at 05:08

## 2018-11-26 RX ADMIN — Medication 200 MILLIGRAM(S): at 12:52

## 2018-11-26 RX ADMIN — Medication 3 MILLILITER(S): at 12:52

## 2018-11-26 RX ADMIN — QUETIAPINE FUMARATE 25 MILLIGRAM(S): 200 TABLET, FILM COATED ORAL at 12:52

## 2018-11-26 RX ADMIN — Medication 3 MILLILITER(S): at 00:41

## 2018-11-26 RX ADMIN — Medication 1: at 08:20

## 2018-11-26 NOTE — CHART NOTE - NSCHARTNOTEFT_GEN_A_CORE
Request from Dr. Giles to facilitate patient discharge. Medication reconciliation reviewed, revised, and resolved with Dr. Giles who had medically cleared patient for discharge with follow-up as advised. Please refer to discharge note for detailed hospital course. Patient is currently stable for discharge to home at this time. (son refusing home PT)    Contact # [phone]

## 2018-11-26 NOTE — DISCHARGE NOTE ADULT - PATIENT PORTAL LINK FT
You can access the [facility] Patient Portal, offered by [facility], by registering with the following website: [email or web address]

## 2018-11-26 NOTE — DISCHARGE NOTE ADULT - HOSPITAL COURSE
72 yo [facility] female with PMH of HTN, HLD, CHF, ?depression, DM (diet controlled), arthritis, presents here with chest pain and SOB.   Found to have RSV (respiratory syncytial virus infection), supportive care provided and now patient clinically improving  now off O2 sating > 93% at rest and ambulation   Chest pain pleuritic in nature that is worse with cough, likely due to viral URI. Troponin 11 --> 9, EKG shows no ST changes  Echo shows grossly normal LV function. Home dose Lasix and Aldactone discontinued. Continue with aspirin and atorvastatin   CT head performed for weakness - shows subacute stroke but MRI consistent with microvascular disease. Seen by neurology, no further neurological workup required at this time. Continue with aspirin and atorvastatin.  Medically cleared for discharge with follow up with primary physician in 1 week 74 yo [facility] female with PMH of HTN, HLD, CHF, ?depression, DM (diet controlled), arthritis, presents here with chest pain and SOB.   Found to have RSV (respiratory syncytial virus infection), supportive care provided and now patient clinically improving  now off O2 sating > 93% at rest and ambulation   Chest pain pleuritic in nature that is worse with cough, likely due to viral URI. Troponin 11 --> 9, EKG shows no ST changes  Echo shows grossly normal LV function. Home dose Lasix and Aldactone discontinued. Continue with aspirin and atorvastatin   CT head performed for weakness - shows subacute stroke but MRI consistent with microvascular disease. Seen by neurology, no further neurological workup required at this time. Continue with aspirin and atorvastatin.  Medically cleared for discharge with follow up with primary physician in 1 week.

## 2018-11-26 NOTE — PROGRESS NOTE ADULT - ASSESSMENT
74 yo [facility] female with PMH of HTN, HLD, CHF, ?depression, DM (diet controlled), arthritis, presents here with chest pain and SOB found to be RSV +

## 2018-11-26 NOTE — DISCHARGE NOTE ADULT - PROVIDER TOKENS
FREE:[LAST:[[facility] Medicine clinic],PHONE:[phone]],FAX:[(   )    -]]

## 2018-11-26 NOTE — PROGRESS NOTE ADULT - ATTENDING COMMENTS
Patient seen and examined with help from son. See above for recommendations
Medically stable for discharge home today.   Will hold lasix and aldactone on discharge as pt appears euvolemic    Discharge time spent 37 minutes

## 2018-11-26 NOTE — DISCHARGE NOTE ADULT - PLAN OF CARE
Complete resolution Call you Health care provider upon arrival home to make a one week follow up appointment.  If you develop fever, chills, malaise, or change in mental status call your Health Care Provider or go to the Emergency Department.  Nutrition is important, eat small frequent meals to help ensure you get adequate calories.  Do not stay in bed all day!  Increase your activity daily as tolerated. resolved Take medications for your blood pressure as recommended.  Eat a heart healthy diet that is low in saturated fats and salt, and includes whole grains, fruits, vegetables and lean protein   Exercise regularly (consult with your physician or cardiologist first); maintain a heart healthy weight.   If you smoke - quit (A resource to help you stop smoking is the [facility] – phone number [phone].). Continue to follow with your primary physician or cardiologist.   Seek medical help for dizziness, Lightheadedness, Blurry vision, Headache, Chest pain, Shortness of breath  Follow up with your medical doctor to establish long term blood pressure treatment goals. Take medications for your blood pressure as recommended.  Eat a heart healthy diet that is low in saturated fats and salt, and includes whole grains, fruits, vegetables and lean protein   Exercise regularly (consult with your physician or cardiologist first); maintain a heart healthy weight.   If you smoke - quit (A resource to help you stop smoking is the [facility] – phone number [phone].). Continue to follow with your primary physician or cardiologist.   Seek medical help for dizziness, Lightheadedness, Blurry vision, Headache, Chest pain, Shortness of breath  Follow up with your medical doctor to establish long term blood pressure treatment goals. Asthma attacks happen when the airways in the lungs become narrow and inflamed  Asthma symptoms can include - Wheezing or noisy breathing, coughing, tight feeling in the chest, shortness of breath  Almost everyone with asthma has a quick-relief inhaler that works in 5- 10mins that they carry with them and long-term controller medicines control asthma and prevent future symptoms. People with frequent asthma symptoms take these 1 or 2 times each day.  You can stay away from things that cause your symptoms or make them worse. Doctors call these "triggers."  avoid them as much as possible. Some common triggers include - Dust, mold, animals, such as dogs and cats, pollen and plants, cigarette smoke, getting sick with a cold or flu (that's why it's important to get a flu shot), stress  Talk to your caregiver about an action plan for managing asthma attacks. This includes the use of a peak flow meter which measures the severity of the attack and medicines that can help stop the attack.   SEEK MEDICAL CARE IF:  You have wheezing, shortness of breath, or a cough even if taking medicine to prevent attacks.   You have thickening of sputum, sputum changes from clear or white to yellow, green, gray, or bloody.   You have any problems that may be related to the medicines you are taking (such as a rash, itching, swelling, or trouble breathing).  You are using a reliever medicine more than 2–3 times per week.  Your peak flow is still at 50–79% of personal best after following your action plan for 1 hour.  SEEK IMMEDIATE MEDICAL CARE IF:  You are short of breath even at rest,or with very little physical activity, chest pain, heart beating fast, bluish color to your lips or fingernails, fever, dizziness,   You seem to be getting worse and are unresponsive to treatment during an asthma attack.   Your peak flow is less than 50% of personal best.

## 2018-11-26 NOTE — DISCHARGE NOTE ADULT - MEDICATION SUMMARY - MEDICATIONS TO STOP TAKING
I will STOP taking the medications listed below when I get home from the hospital:    amoxicillin-clavulanate 500 mg-125 mg oral tablet    doxophylline    supravit    furosemide 40 mg oral tablet    seretide    levoFLOXacin 500 mg oral tablet    Aldactone 50 mg oral tablet    seretide (salmeterol 25mcg/fluticasone 125 mcg)    Frulac-40 (furosemide 40mg/spironolactone 50 mg)    doxophylline 200 mg    domperidone 10 mg    colomycin 2 million units/vial

## 2018-11-26 NOTE — DISCHARGE NOTE ADULT - MEDICATION SUMMARY - MEDICATIONS TO CHANGE
I will SWITCH the dose or number of times a day I take the medications listed below when I get home from the hospital:    esomeprazole    Ventolin HFA    quetapine  -- 25 milligram(s)    esomeprazole  -- 20 mg orally

## 2018-11-26 NOTE — PROGRESS NOTE ADULT - SUBJECTIVE AND OBJECTIVE BOX
Eliseo Giles MD  Division of Hospital Medicine  Pager [phone]      EREN SHARMA  73y  Female      Patient is a 73y old  Female who presents with a chief complaint of chest pain and SOB (26 Nov 2018 12:55)      INTERVAL HPI/OVERNIGHT EVENTS:  Seen at bedside, son provided translation. Feeling better, no chest pain, sob on ambulation. No fever/chills.       REVIEW OF SYSTEMS: 14 point ROS negative unless listed above    T(C): 36.8 (11-26-18 @ 11:11), Max: 36.8 (11-26-18 @ 11:11)  HR: 92 (11-26-18 @ 11:11) (81 - 94)  BP: 143/79 (11-26-18 @ 11:11) (132/78 - 143/79)  RR: 17 (11-26-18 @ 11:11) (17 - 18)  SpO2: 93% (11-26-18 @ 11:11) (93% - 96%)  Wt(kg): --Vital Signs Last 24 Hrs  T(C): 36.8 (26 Nov 2018 11:11), Max: 36.8 (26 Nov 2018 11:11)  T(F): 98.2 (26 Nov 2018 11:11), Max: 98.2 (26 Nov 2018 11:11)  HR: 92 (26 Nov 2018 11:11) (81 - 94)  BP: 143/79 (26 Nov 2018 11:11) (132/78 - 143/79)  BP(mean): --  RR: 17 (26 Nov 2018 11:11) (17 - 18)  SpO2: 93% (26 Nov 2018 11:11) (93% - 96%)    PHYSICAL EXAM:  GENERAL: NAD  HEAD:  Atraumatic, Normocephalic  EYES: EOMI, conjunctiva and sclera clear  NECK: Supple, No JVD  CHEST/LUNG: Clear to auscultation bilaterally; No wheeze  HEART: Regular rate and rhythm, +S1/S2  ABDOMEN: Soft, Nontender, Nondistended; Bowel sounds present  EXTREMITIES:  no peripheral edema   PSYCH: AAOx3  NEUROLOGY: non-focal    Consultant(s) Notes Reviewed:  [x ] YES  [ ] NO  Care Discussed with Consultants/Other Providers [ x] YES  [ ] NO    LABS:                        11.1   6.01  )-----------( 310      ( 26 Nov 2018 07:28 )             35.0     11-26    140  |  103  |  8   ----------------------------<  95  4.2   |  27  |  0.73    Ca    9.6      26 Nov 2018 06:13          CAPILLARY BLOOD GLUCOSE      POCT Blood Glucose.: 117 mg/dL (26 Nov 2018 11:46)  POCT Blood Glucose.: 170 mg/dL (26 Nov 2018 07:49)  POCT Blood Glucose.: 173 mg/dL (25 Nov 2018 22:19)  POCT Blood Glucose.: 156 mg/dL (25 Nov 2018 19:19)            RADIOLOGY & ADDITIONAL TESTS:    Imaging Personally Reviewed:  [x ] YES  [ ] NO

## 2018-11-26 NOTE — PROGRESS NOTE ADULT - PROBLEM SELECTOR PLAN 2
-patient clinically improved. Cough dissipated  -continue with duonebs  -continue with guaifenesin  -now off O2 at rest

## 2018-11-26 NOTE — PROGRESS NOTE ADULT - PROBLEM SELECTOR PLAN 5
-CT head showd subacute stroke, however, MRI c/w microvascular disease. MRA without significant stenosis  -continue with aspirin and atorvastatin   -seen by neurology, no further neuro workup inpatient  - Seen by PT/OT

## 2018-11-26 NOTE — DISCHARGE NOTE ADULT - SECONDARY DIAGNOSIS.
Chest pain, unspecified type Hypertension, unspecified type Hyperlipidemia, unspecified hyperlipidemia type Asthma, unspecified asthma severity, unspecified whether complicated, unspecified whether persistent

## 2018-11-26 NOTE — DISCHARGE NOTE ADULT - CARE PLAN
Principal Discharge DX:	RSV (respiratory syncytial virus infection)  Goal:	Complete resolution  Assessment and plan of treatment:	Call you Health care provider upon arrival home to make a one week follow up appointment.  If you develop fever, chills, malaise, or change in mental status call your Health Care Provider or go to the Emergency Department.  Nutrition is important, eat small frequent meals to help ensure you get adequate calories.  Do not stay in bed all day!  Increase your activity daily as tolerated.  Secondary Diagnosis:	Chest pain, unspecified type  Assessment and plan of treatment:	resolved  Secondary Diagnosis:	Hypertension, unspecified type  Assessment and plan of treatment:	Take medications for your blood pressure as recommended.  Eat a heart healthy diet that is low in saturated fats and salt, and includes whole grains, fruits, vegetables and lean protein   Exercise regularly (consult with your physician or cardiologist first); maintain a heart healthy weight.   If you smoke - quit (A resource to help you stop smoking is the [facility] [employer] [facility] – phone number [phone].). Continue to follow with your primary physician or cardiologist.   Seek medical help for dizziness, Lightheadedness, Blurry vision, Headache, Chest pain, Shortness of breath  Follow up with your medical doctor to establish long term blood pressure treatment goals.  Secondary Diagnosis:	Hyperlipidemia, unspecified hyperlipidemia type  Assessment and plan of treatment:	Take medications for your blood pressure as recommended.  Eat a heart healthy diet that is low in saturated fats and salt, and includes whole grains, fruits, vegetables and lean protein   Exercise regularly (consult with your physician or cardiologist first); maintain a heart healthy weight.   If you smoke - quit (A resource to help you stop smoking is the [facility] [employer] – phone number [phone].). Continue to follow with your primary physician or cardiologist.   Seek medical help for dizziness, Lightheadedness, Blurry vision, Headache, Chest pain, Shortness of breath  Follow up with your medical doctor to establish long term blood pressure treatment goals.  Secondary Diagnosis:	Asthma, unspecified asthma severity, unspecified whether complicated, unspecified whether persistent  Assessment and plan of treatment:	Asthma attacks happen when the airways in the lungs become narrow and inflamed  Asthma symptoms can include - Wheezing or noisy breathing, coughing, tight feeling in the chest, shortness of breath  Almost everyone with asthma has a quick-relief inhaler that works in 5- 10mins that they carry with them and long-term controller medicines control asthma and prevent future symptoms. People with frequent asthma symptoms take these 1 or 2 times each day.  You can stay away from things that cause your symptoms or make them worse. Doctors call these "triggers."  avoid them as much as possible. Some common triggers include - Dust, mold, animals, such as dogs and cats, pollen and plants, cigarette smoke, getting sick with a cold or flu (that's why it's important to get a flu shot), stress  Talk to your caregiver about an action plan for managing asthma attacks. This includes the use of a peak flow meter which measures the severity of the attack and medicines that can help stop the attack.   SEEK MEDICAL CARE IF:  You have wheezing, shortness of breath, or a cough even if taking medicine to prevent attacks.   You have thickening of sputum, sputum changes from clear or white to yellow, green, gray, or bloody.   You have any problems that may be related to the medicines you are taking (such as a rash, itching, swelling, or trouble breathing).  You are using a reliever medicine more than 2–3 times per week.  Your peak flow is still at 50–79% of personal best after following your action plan for 1 hour.  SEEK IMMEDIATE MEDICAL CARE IF:  You are short of breath even at rest,or with very little physical activity, chest pain, heart beating fast, bluish color to your lips or fingernails, fever, dizziness,   You seem to be getting worse and are unresponsive to treatment during an asthma attack.   Your peak flow is less than 50% of personal best.

## 2018-11-26 NOTE — DISCHARGE NOTE ADULT - FINDINGS/TREATMENT
1. Aortic valve not well visualized; appears calcified.  Mild aortic regurgitation.  2. Increased relative wall thickness with normal left  ventricular mass index, consistent with concentric left  ventricular remodeling.  3. Endocardium not well visualized; grossly normal left  ventricular systolic function.  4. Normal right ventricular size and function.  5. Estimated pulmonary artery systolic pressure equals 31  mm Hg, assuming right atrial pressure equals 8  mm Hg,  consistent with normal pulmonary pressures.  EF - 65% Microvascular disease. No acute or subacute infarction. No abnormal   intracranial enhancement. Moderate to severe inflammatory paranasal sinus   disease.  Intracranial MR angiography demonstrates no significant stenosis,   evidence for aneurysm, or vascular malformation.  Extracranial MR angiography demonstrates no flow-limiting stenosis by   NASCET criteria.

## 2018-12-26 PROCEDURE — 87486 CHLMYD PNEUM DNA AMP PROBE: CPT

## 2018-12-26 PROCEDURE — 71046 X-RAY EXAM CHEST 2 VIEWS: CPT

## 2018-12-26 PROCEDURE — 84100 ASSAY OF PHOSPHORUS: CPT

## 2018-12-26 PROCEDURE — 84295 ASSAY OF SERUM SODIUM: CPT

## 2018-12-26 PROCEDURE — 80061 LIPID PANEL: CPT

## 2018-12-26 PROCEDURE — A9585: CPT

## 2018-12-26 PROCEDURE — 71275 CT ANGIOGRAPHY CHEST: CPT

## 2018-12-26 PROCEDURE — 83605 ASSAY OF LACTIC ACID: CPT

## 2018-12-26 PROCEDURE — 84443 ASSAY THYROID STIM HORMONE: CPT

## 2018-12-26 PROCEDURE — 83690 ASSAY OF LIPASE: CPT

## 2018-12-26 PROCEDURE — 82330 ASSAY OF CALCIUM: CPT

## 2018-12-26 PROCEDURE — 80048 BASIC METABOLIC PNL TOTAL CA: CPT

## 2018-12-26 PROCEDURE — 93306 TTE W/DOPPLER COMPLETE: CPT

## 2018-12-26 PROCEDURE — 83036 HEMOGLOBIN GLYCOSYLATED A1C: CPT

## 2018-12-26 PROCEDURE — 82565 ASSAY OF CREATININE: CPT

## 2018-12-26 PROCEDURE — 70450 CT HEAD/BRAIN W/O DYE: CPT

## 2018-12-26 PROCEDURE — 80053 COMPREHEN METABOLIC PANEL: CPT

## 2018-12-26 PROCEDURE — 70549 MR ANGIOGRAPH NECK W/O&W/DYE: CPT

## 2018-12-26 PROCEDURE — 99285 EMERGENCY DEPT VISIT HI MDM: CPT | Mod: 25

## 2018-12-26 PROCEDURE — 82947 ASSAY GLUCOSE BLOOD QUANT: CPT

## 2018-12-26 PROCEDURE — 85027 COMPLETE CBC AUTOMATED: CPT

## 2018-12-26 PROCEDURE — 82550 ASSAY OF CK (CPK): CPT

## 2018-12-26 PROCEDURE — 83880 ASSAY OF NATRIURETIC PEPTIDE: CPT

## 2018-12-26 PROCEDURE — 87798 DETECT AGENT NOS DNA AMP: CPT

## 2018-12-26 PROCEDURE — 70544 MR ANGIOGRAPHY HEAD W/O DYE: CPT

## 2018-12-26 PROCEDURE — 85610 PROTHROMBIN TIME: CPT

## 2018-12-26 PROCEDURE — 81003 URINALYSIS AUTO W/O SCOPE: CPT

## 2018-12-26 PROCEDURE — 84484 ASSAY OF TROPONIN QUANT: CPT

## 2018-12-26 PROCEDURE — 83735 ASSAY OF MAGNESIUM: CPT

## 2018-12-26 PROCEDURE — 84132 ASSAY OF SERUM POTASSIUM: CPT

## 2018-12-26 PROCEDURE — 87581 M.PNEUMON DNA AMP PROBE: CPT

## 2018-12-26 PROCEDURE — 97166 OT EVAL MOD COMPLEX 45 MIN: CPT

## 2018-12-26 PROCEDURE — 74177 CT ABD & PELVIS W/CONTRAST: CPT

## 2018-12-26 PROCEDURE — 94640 AIRWAY INHALATION TREATMENT: CPT

## 2018-12-26 PROCEDURE — 85730 THROMBOPLASTIN TIME PARTIAL: CPT

## 2018-12-26 PROCEDURE — 82607 VITAMIN B-12: CPT

## 2018-12-26 PROCEDURE — 87633 RESP VIRUS 12-25 TARGETS: CPT

## 2018-12-26 PROCEDURE — 86780 TREPONEMA PALLIDUM: CPT

## 2018-12-26 PROCEDURE — 92610 EVALUATE SWALLOWING FUNCTION: CPT

## 2018-12-26 PROCEDURE — 97162 PT EVAL MOD COMPLEX 30 MIN: CPT

## 2018-12-26 PROCEDURE — 85014 HEMATOCRIT: CPT

## 2018-12-26 PROCEDURE — 82803 BLOOD GASES ANY COMBINATION: CPT

## 2018-12-26 PROCEDURE — 82435 ASSAY OF BLOOD CHLORIDE: CPT

## 2018-12-26 PROCEDURE — 82962 GLUCOSE BLOOD TEST: CPT

## 2018-12-26 PROCEDURE — 82746 ASSAY OF FOLIC ACID SERUM: CPT

## 2018-12-26 PROCEDURE — 70551 MRI BRAIN STEM W/O DYE: CPT

## 2018-12-26 PROCEDURE — 87086 URINE CULTURE/COLONY COUNT: CPT

## 2018-12-26 PROCEDURE — 93005 ELECTROCARDIOGRAM TRACING: CPT

## 2019-02-25 NOTE — ED ADULT NURSE REASSESSMENT NOTE - NS ED NURSE REASSESS COMMENT FT1
----- Message from Yelena Jimenez sent at 2/25/2019  9:06 AM CST -----  Contact: Patient [phone]  Patient would like to get medical advice.  Symptoms (please be specific):  Stomach paint level 9 and diarrhea  How long has patient had these symptoms:  2 days  Pharmacy name and phone #[id number] [address] [facility] [phone] (Phone) [phone] (Fax)    Comments:  She took peptol bismol and it seems to be working but, then stopped taking it because she thinks it made her constipated.     2232- awaiting for RVP results to RTm.

## 2021-07-03 NOTE — H&P ADULT - FAMILY HISTORY
Addendum Note by Juwan Owens MD at 1/17/2020  1:20 PM     Author: Juwan Owens MD Service: -- Author Type: Physician    Filed: 1/18/2020  1:26 PM Encounter Date: 1/17/2020 Status: Signed    : Juwan Owens MD (Physician)    Addended by: JUWAN OWENS on: 1/18/2020 01:26 PM        Modules accepted: Orders        
No pertinent family history in first degree relatives

## 2021-08-10 NOTE — ED ADULT NURSE NOTE - NS ED NURSE REPORT GIVEN DT
23-Nov-2018 06:10 Albendazole Counseling:  I discussed with the patient the risks of albendazole including but not limited to cytopenia, kidney damage, nausea/vomiting and severe allergy.  The patient understands that this medication is being used in an off-label manner.

## 2021-08-28 NOTE — PROGRESS NOTE ADULT - PROBLEM SELECTOR PLAN 6
patient was on lasix and aldactone at home, holding currently as pt appears euvolemic and pt with EF 65%. Not in exacerbation currently  -outpatient f/u for re-initiation

## 2022-08-21 NOTE — PHYSICAL THERAPY INITIAL EVALUATION ADULT - PERTINENT HX OF CURRENT PROBLEM, REHAB EVAL
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 No acute events overnight.  Pt denies cp, palpitations, sob, abd pain, N/V, fever, chills.    ROS:  All other review of systems negative    Allergies    adhesives (Pruritus)  No Known Drug Allergies    Intolerances        MEDICATIONS  (STANDING):  artificial  tears Solution 1 Drop(s) Both EYES three times a day  atorvastatin 40 milliGRAM(s) Oral at bedtime  brivaracetam 50 milliGRAM(s) Oral every 12 hours  dextrose 5%. 1000 milliLiter(s) (50 mL/Hr) IV Continuous <Continuous>  dextrose 5%. 1000 milliLiter(s) (100 mL/Hr) IV Continuous <Continuous>  dextrose 50% Injectable 25 Gram(s) IV Push once  dextrose 50% Injectable 12.5 Gram(s) IV Push once  dextrose 50% Injectable 25 Gram(s) IV Push once  diVALproex  milliGRAM(s) Oral every 8 hours  enoxaparin Injectable 40 milliGRAM(s) SubCutaneous <User Schedule>  gabapentin 200 milliGRAM(s) Oral at bedtime  glucagon  Injectable 1 milliGRAM(s) IntraMuscular once  insulin glargine Injectable (LANTUS) 12 Unit(s) SubCutaneous at bedtime  insulin lispro (ADMELOG) corrective regimen sliding scale   SubCutaneous three times a day before meals  insulin lispro (ADMELOG) corrective regimen sliding scale   SubCutaneous at bedtime  insulin lispro Injectable (ADMELOG) 7 Unit(s) SubCutaneous three times a day before meals  melatonin 6 milliGRAM(s) Oral at bedtime  metoprolol tartrate 25 milliGRAM(s) Oral two times a day  pantoprazole    Tablet 40 milliGRAM(s) Oral before breakfast  polyethylene glycol 3350 17 Gram(s) Oral daily  senna 2 Tablet(s) Oral at bedtime  zinc oxide 40% Paste 1 Application(s) Topical two times a day    MEDICATIONS  (PRN):  acetaminophen     Tablet .. 650 milliGRAM(s) Oral every 6 hours PRN Temp greater or equal to 38C (100.4F), Mild Pain (1 - 3)  bisacodyl Oral Tab/Cap - Peds 5 milliGRAM(s) Oral daily PRN Constipation  dextrose Oral Gel 15 Gram(s) Oral once PRN Blood Glucose LESS THAN 70 milliGRAM(s)/deciliter      Vital Signs Last 24 Hrs  T(C): 36.7 (21 Aug 2022 07:34), Max: 36.7 (21 Aug 2022 07:34)  T(F): 98 (21 Aug 2022 07:34), Max: 98 (21 Aug 2022 07:34)  HR: 66 (21 Aug 2022 07:34) (66 - 78)  BP: 102/62 (21 Aug 2022 07:34) (102/62 - 121/72)  BP(mean): --  RR: 16 (21 Aug 2022 07:34) (16 - 17)  SpO2: 98% (21 Aug 2022 07:34) (98% - 99%)    Parameters below as of 21 Aug 2022 07:34  Patient On (Oxygen Delivery Method): room air      CAPILLARY BLOOD GLUCOSE      POCT Blood Glucose.: 193 mg/dL (21 Aug 2022 07:37)  POCT Blood Glucose.: 141 mg/dL (20 Aug 2022 21:21)  POCT Blood Glucose.: 112 mg/dL (20 Aug 2022 17:08)  POCT Blood Glucose.: 121 mg/dL (20 Aug 2022 12:11)    I&O's Summary      PHYSICAL EXAM:  GENERAL: NAD, well-developed female  HEAD:  Surgical incision c/d/i  NECK: Supple, No JVD  CHEST/LUNG: Clear to auscultation bilaterally; No wheeze, nonlabored breathing  HEART: Regular rate and rhythm; No murmurs, rubs, or gallops  ABDOMEN: Soft, Nontender, Nondistended; Bowel sounds present  EXTREMITIES:  2+ Peripheral Pulses, No clubbing, cyanosis, or edema  PSYCH: calm, appropriate mood  SKIN: + Forehead blister with fluid drained    LABS:        141  |  107  |  31<H>  ----------------------------<  158<H>  4.3   |  22  |  1.09    Ca    8.2<L>      21 Aug 2022 06:00            Urinalysis Basic - ( 21 Aug 2022 10:30 )    Color: Yellow / Appearance: Clear / S.020 / pH: x  Gluc: x / Ketone: Negative  / Bili: Negative / Urobili: Negative   Blood: x / Protein: Negative / Nitrite: Negative   Leuk Esterase: Negative / RBC: x / WBC x   Sq Epi: x / Non Sq Epi: x / Bacteria: x        RADIOLOGY & ADDITIONAL TESTS:  Results Reviewed:   Imaging Personally Reviewed:  Electrocardiogram Personally Reviewed:    COORDINATION OF CARE:  Care Discussed with Consultants/Other Providers [Y/N]:  Prior or Outpatient Records Reviewed [Y/N]: Pt is a 73 F with PMH of recent CVA with dysphagia, HTN, HLD, CHF, depression, DM, OA that presents with chest pain and SOB. Pt recently arrived from [facility]. CT Head: hypodensity within the anterior limb of right internal capsule may represent an acute/subacute lacunar infarct. no acute intracranial hemorrhage. CTA C&A: (-) PE. No acute intraabdominal pathology. MRI Brain: microvascular disease. No acute or subacute infarction. No abnormal intracranial enhancement.

## 2022-11-23 NOTE — PATIENT PROFILE ADULT - NSASFALLNEEDSASSIST_GEN_A_NUR
pt observed moving bue minimally. Prom lue wfl. right shoulder 0-80 right elbow 10-90 (prior fx with sx repair May 2022 per daughter) yes

## 2024-02-01 PROBLEM — J45.909 UNSPECIFIED ASTHMA, UNCOMPLICATED: Chronic | Status: ACTIVE | Noted: 2018-11-22

## 2024-02-01 PROBLEM — I21.9 ACUTE MYOCARDIAL INFARCTION, UNSPECIFIED: Chronic | Status: ACTIVE | Noted: 2018-11-22

## 2024-02-01 PROBLEM — I63.9 CEREBRAL INFARCTION, UNSPECIFIED: Chronic | Status: ACTIVE | Noted: 2018-11-22

## 2024-02-01 PROBLEM — J18.9 PNEUMONIA, UNSPECIFIED ORGANISM: Chronic | Status: ACTIVE | Noted: 2018-11-22

## 2024-02-01 PROBLEM — I10 ESSENTIAL (PRIMARY) HYPERTENSION: Chronic | Status: ACTIVE | Noted: 2018-11-22

## 2024-02-01 PROBLEM — I50.9 HEART FAILURE, UNSPECIFIED: Chronic | Status: ACTIVE | Noted: 2018-11-22

## 2024-02-01 PROBLEM — E78.5 HYPERLIPIDEMIA, UNSPECIFIED: Chronic | Status: ACTIVE | Noted: 2018-11-22

## 2024-02-29 ENCOUNTER — APPOINTMENT (OUTPATIENT)
Dept: GASTROENTEROLOGY | Facility: CLINIC | Age: 79
End: 2024-02-29

## 2024-02-29 PROBLEM — Z00.00 ENCOUNTER FOR PREVENTIVE HEALTH EXAMINATION: Status: ACTIVE | Noted: 2024-02-29

## 2024-04-12 ENCOUNTER — APPOINTMENT (OUTPATIENT)
Dept: GASTROENTEROLOGY | Facility: CLINIC | Age: 79
End: 2024-04-12

## 2025-04-23 NOTE — ED PROVIDER NOTE - DISPOSITION TYPE
Received a call from Graciela. She stated that Med had his catheter changed 4/21. Since he has had intermittent  blood coming from his penis. She also said yesterday and today he has been shivering and unable to warm up. He has a low grade fever of 99.5. She said he made comment that he wondered if he should be in hospital. I did direct her to take him to urgent care to be evaluated. She agreed.   
ADMIT